# Patient Record
Sex: MALE | Race: WHITE | Employment: OTHER | URBAN - METROPOLITAN AREA
[De-identification: names, ages, dates, MRNs, and addresses within clinical notes are randomized per-mention and may not be internally consistent; named-entity substitution may affect disease eponyms.]

---

## 2017-06-05 ENCOUNTER — HOSPITAL ENCOUNTER (OUTPATIENT)
Dept: CT IMAGING | Age: 62
Discharge: HOME | End: 2017-06-05
Attending: NURSE PRACTITIONER | Admitting: NURSE PRACTITIONER

## 2017-06-05 DIAGNOSIS — J43.0 UNILATERAL EMPHYSEMA (HCC): ICD-10-CM

## 2017-06-05 DIAGNOSIS — J43.0 UNILATERAL PULMONARY EMPHYSEMA (MACLEOD'S SYNDROME) (HCC): ICD-10-CM

## 2017-06-05 LAB
CREAT SERPL-MCNC: 0.9 MG/DL (ref 0.8–1.3)
GFR AFRICAN AMERICAN: >60
GFR NON-AFRICAN AMERICAN: >60

## 2020-02-06 ENCOUNTER — HOSPITAL ENCOUNTER (OUTPATIENT)
Dept: NON INVASIVE DIAGNOSTICS | Age: 65
Discharge: HOME | End: 2020-02-06
Payer: COMMERCIAL

## 2020-02-06 ENCOUNTER — HOSPITAL ENCOUNTER (OUTPATIENT)
Dept: CT IMAGING | Age: 65
Discharge: HOME | End: 2020-02-06
Payer: COMMERCIAL

## 2020-02-06 LAB
LV EF: 47 %
LVEF MODALITY: NORMAL

## 2020-02-06 PROCEDURE — 71260 CT THORAX DX C+: CPT

## 2020-02-06 PROCEDURE — 6360000002 HC RX W HCPCS: Performed by: INTERNAL MEDICINE

## 2020-02-06 PROCEDURE — A9502 TC99M TETROFOSMIN: HCPCS | Performed by: INTERNAL MEDICINE

## 2020-02-06 PROCEDURE — 93017 CV STRESS TEST TRACING ONLY: CPT

## 2020-02-06 PROCEDURE — 3430000000 HC RX DIAGNOSTIC RADIOPHARMACEUTICAL: Performed by: INTERNAL MEDICINE

## 2020-02-06 PROCEDURE — 6360000004 HC RX CONTRAST MEDICATION: Performed by: INTERNAL MEDICINE

## 2020-02-06 PROCEDURE — 78452 HT MUSCLE IMAGE SPECT MULT: CPT

## 2020-02-06 RX ADMIN — REGADENOSON 0.4 MG: 0.08 INJECTION, SOLUTION INTRAVENOUS at 12:07

## 2020-02-06 RX ADMIN — IOPAMIDOL 75 ML: 755 INJECTION, SOLUTION INTRAVENOUS at 11:03

## 2020-02-06 RX ADMIN — TETROFOSMIN 10 MILLICURIE: 1.38 INJECTION, POWDER, LYOPHILIZED, FOR SOLUTION INTRAVENOUS at 10:57

## 2020-02-06 RX ADMIN — TETROFOSMIN 30 MILLICURIE: 1.38 INJECTION, POWDER, LYOPHILIZED, FOR SOLUTION INTRAVENOUS at 12:03

## 2020-02-20 ENCOUNTER — HOSPITAL ENCOUNTER (OUTPATIENT)
Dept: NON INVASIVE DIAGNOSTICS | Age: 65
Discharge: HOME | End: 2020-02-20
Payer: COMMERCIAL

## 2020-02-20 LAB
LV EF: 28 %
LVEF MODALITY: NORMAL

## 2020-02-20 PROCEDURE — 93306 TTE W/DOPPLER COMPLETE: CPT

## 2020-05-01 ENCOUNTER — HOSPITAL ENCOUNTER (EMERGENCY)
Age: 65
Discharge: HOME | End: 2020-05-01
Payer: COMMERCIAL

## 2020-05-01 VITALS
DIASTOLIC BLOOD PRESSURE: 84 MMHG | WEIGHT: 272.05 LBS | OXYGEN SATURATION: 96 % | SYSTOLIC BLOOD PRESSURE: 153 MMHG | HEART RATE: 103 BPM | TEMPERATURE: 97.9 F | HEIGHT: 68 IN | BODY MASS INDEX: 41.23 KG/M2 | RESPIRATION RATE: 22 BRPM

## 2020-05-01 LAB
A/G RATIO: 1.1 (ref 1.1–2.2)
ABO/RH: NORMAL
ALBUMIN SERPL-MCNC: 4.3 G/DL (ref 3.4–5)
ALP BLD-CCNC: 179 U/L (ref 40–129)
ALT SERPL-CCNC: 12 U/L (ref 10–40)
ANION GAP SERPL CALCULATED.3IONS-SCNC: 17 MMOL/L (ref 3–16)
ANTIBODY SCREEN: NORMAL
AST SERPL-CCNC: 17 U/L (ref 15–37)
BASOPHILS ABSOLUTE: 0.1 K/UL (ref 0–0.2)
BASOPHILS RELATIVE PERCENT: 1 %
BILIRUB SERPL-MCNC: 0.4 MG/DL (ref 0–1)
BUN BLDV-MCNC: 7 MG/DL (ref 7–20)
CALCIUM SERPL-MCNC: 9.2 MG/DL (ref 8.3–10.6)
CHLORIDE BLD-SCNC: 103 MMOL/L (ref 99–110)
CO2: 22 MMOL/L (ref 21–32)
CREAT SERPL-MCNC: 0.7 MG/DL (ref 0.8–1.3)
EOSINOPHILS ABSOLUTE: 0.1 K/UL (ref 0–0.6)
EOSINOPHILS RELATIVE PERCENT: 1.1 %
GFR AFRICAN AMERICAN: >60
GFR NON-AFRICAN AMERICAN: >60
GLOBULIN: 3.8 G/DL
GLUCOSE BLD-MCNC: 110 MG/DL (ref 70–99)
HCT VFR BLD CALC: 47.8 % (ref 40.5–52.5)
HEMOGLOBIN: 16.1 G/DL (ref 13.5–17.5)
LACTIC ACID: 3 MMOL/L (ref 0.4–2)
LYMPHOCYTES ABSOLUTE: 1.6 K/UL (ref 1–5.1)
LYMPHOCYTES RELATIVE PERCENT: 20.6 %
MCH RBC QN AUTO: 29.6 PG (ref 26–34)
MCHC RBC AUTO-ENTMCNC: 33.8 G/DL (ref 31–36)
MCV RBC AUTO: 87.6 FL (ref 80–100)
MONOCYTES ABSOLUTE: 0.8 K/UL (ref 0–1.3)
MONOCYTES RELATIVE PERCENT: 11 %
NEUTROPHILS ABSOLUTE: 5 K/UL (ref 1.7–7.7)
NEUTROPHILS RELATIVE PERCENT: 66.3 %
OCCULT BLOOD DIAGNOSTIC: NORMAL
PDW BLD-RTO: 15.7 % (ref 12.4–15.4)
PLATELET # BLD: 193 K/UL (ref 135–450)
PLATELET SLIDE REVIEW: ADEQUATE
PMV BLD AUTO: 11.1 FL (ref 5–10.5)
POTASSIUM REFLEX MAGNESIUM: 3.6 MMOL/L (ref 3.5–5.1)
RBC # BLD: 5.46 M/UL (ref 4.2–5.9)
SODIUM BLD-SCNC: 142 MMOL/L (ref 136–145)
TOTAL PROTEIN: 8.1 G/DL (ref 6.4–8.2)
WBC # BLD: 7.6 K/UL (ref 4–11)

## 2020-05-01 PROCEDURE — 99283 EMERGENCY DEPT VISIT LOW MDM: CPT

## 2020-05-01 PROCEDURE — 86900 BLOOD TYPING SEROLOGIC ABO: CPT

## 2020-05-01 PROCEDURE — 85025 COMPLETE CBC W/AUTO DIFF WBC: CPT

## 2020-05-01 PROCEDURE — 86901 BLOOD TYPING SEROLOGIC RH(D): CPT

## 2020-05-01 PROCEDURE — 83605 ASSAY OF LACTIC ACID: CPT

## 2020-05-01 PROCEDURE — 96360 HYDRATION IV INFUSION INIT: CPT

## 2020-05-01 PROCEDURE — G0328 FECAL BLOOD SCRN IMMUNOASSAY: HCPCS

## 2020-05-01 PROCEDURE — 86850 RBC ANTIBODY SCREEN: CPT

## 2020-05-01 PROCEDURE — 2580000003 HC RX 258: Performed by: NURSE PRACTITIONER

## 2020-05-01 PROCEDURE — 80053 COMPREHEN METABOLIC PANEL: CPT

## 2020-05-01 RX ORDER — 0.9 % SODIUM CHLORIDE 0.9 %
1000 INTRAVENOUS SOLUTION INTRAVENOUS ONCE
Status: COMPLETED | OUTPATIENT
Start: 2020-05-01 | End: 2020-05-01

## 2020-05-01 RX ADMIN — SODIUM CHLORIDE 1000 ML: 9 INJECTION, SOLUTION INTRAVENOUS at 20:09

## 2020-05-01 ASSESSMENT — ENCOUNTER SYMPTOMS
DIARRHEA: 0
BLOOD IN STOOL: 1
NAUSEA: 0
BACK PAIN: 0
ABDOMINAL PAIN: 0
SHORTNESS OF BREATH: 0
COUGH: 0
ABDOMINAL DISTENTION: 0
CONSTIPATION: 0
VOMITING: 0

## 2020-05-01 NOTE — ED PROVIDER NOTES
**EVALUATED BY ADVANCED PRACTICE PROVIDER**        [address] [facility] ENCOUNTER      Pt Name: Greg De Leon  WK  [place] 1955  Date of evaluation: 2020  Provider: THANIA Choi CNP      Chief Complaint:    Chief Complaint   Patient presents with    Rectal Bleeding    Diarrhea       Nursing Notes, Past Medical Hx, Past Surgical Hx, Social Hx, Allergies, and Family Hx were all reviewed and agreed with or any disagreements were addressed in the HPI.    HPI:  (Location, Duration, Timing, Severity, Quality, Assoc Sx, Context, Modifying factors)  This is a  75 y.o. male with PMH significant for A. fib anticoagulated on Eliquis who presents to the emergency department today complaining of bright red blood per rectum. He advised he has had one episode daily for the last 2 weeks. He denies abdominal pain. No fevers. No rectal pain. Patient does report that he was recently on a 3-day alcohol binger but is sober now. PastMedical/Surgical History:      Diagnosis Date    A-fib ([facility] [address]     irreg heartbeat    Arthritis     Bone spur     l ankle    DDD (degenerative disc disease)     DM (diabetes mellitus) ([address]     ETOH abuse     recovering    Hepatitis C     HTN (hypertension)     Internal hemorrhoids     OA (osteoarthritis)     Sleep apnea     Smoker     ippd    Spinal stenosis      History reviewed. No pertinent surgical history. Medications:  Previous Medications    ALBUTEROL (PROVENTIL HFA) 108 (90 BASE) MCG/ACT INHALER    Inhale 2 puffs into the lungs every 6 hours as needed for Wheezing. AMLODIPINE (NORVASC) 10 MG TABLET    TAKE ONE TABLET BY MOUTH EVERY DAY    BUDESONIDE-FORMOTEROL (SYMBICORT) 160-4.5 MCG/ACT AERO    Inhale 2 puffs into the lungs 2 times daily.     BUSPIRONE (BUSPAR) 10 MG TABLET    TAKE ONE TABLET BY MOUTH TWICE A DAY    CARVEDILOL (COREG) 25 MG TABLET    TAKE ONE TABLET BY MOUTH TWICE A DAY range of motion. Vascular: No JVD. Cardiovascular:      Rate and Rhythm: Tachycardia present. Rhythm irregularly irregular. Heart sounds: No murmur. No friction rub. No gallop. Pulmonary:      Effort: Pulmonary effort is normal. No respiratory distress. Breath sounds: Normal breath sounds. Abdominal:      General: There is no distension. Palpations: Abdomen is soft. Abdomen is not rigid. Tenderness: There is no abdominal tenderness. There is no guarding or rebound. Genitourinary:     Comments: No anal fissures or external hemorrhoid visualized. Digital rectal exam performed and tolerated well with Ignacia Ortega RN present. No internal hemorrhoids or other masses palpated. No exquisite prostate tenderness. No bright red blood return. Soft, light brown stool returned which tested Hemoccult negative. Musculoskeletal: Normal range of motion. Skin:     General: Skin is warm and dry. Capillary Refill: Capillary refill takes less than 2 seconds. Findings: No rash. Neurological:      General: No focal deficit present. Mental Status: He is alert and oriented to person, place, and time. Cranial Nerves: Cranial nerves are intact.    Psychiatric:         Mood and Affect: Mood normal.         MEDICAL DECISION MAKING    Vitals:    Vitals:    05/01/20 1902 05/01/20 1916 05/01/20 1930 05/01/20 2000   BP: (!) 151/77  (!) 142/60 138/64   Pulse: 100  108 118   Resp: 18  22 27   Temp: 97.9 °F (36.6 °C)      TempSrc: Oral      SpO2: 96%  95% 95%   Weight:  272 lb 0.8 oz (123.4 kg)     Height: 5' 8\" (1.727 m)          LABS:  Labs Reviewed   CBC WITH AUTO DIFFERENTIAL - Abnormal; Notable for the following components:       Result Value    RDW 15.7 (*)     MPV 11.1 (*)     All other components within normal limits    Narrative:     Performed at:  [address] [address]   Phone [phone]   COMPREHENSIVE METABOLIC PANEL W/ REFLEX TO MG FOR LOW K - Abnormal; Notable for the following components:    Anion Gap 17 (*)     Glucose 110 (*)     CREATININE 0.7 (*)     Alkaline Phosphatase 179 (*)     All other components within normal limits    Narrative:     Performed at:  [address] [address]   Phone [phone]   LACTIC ACID, PLASMA - Abnormal; Notable for the following components:    Lactic Acid 3.0 (*)     All other components within normal limits    Narrative:     Performed at:  [address] [address]   Phone [phone]   BLOOD OCCULT STOOL DIAGNOSTIC    Narrative:     ORDER#: [id number]                          ORDERED BY: Lexi Valencia  SOURCE: Stool                              COLLECTED:  05/01/20 20:10  ANTIBIOTICS AT BECKIE.:                      RECEIVED :  05/01/20 20:23  Performed at:  [address] [address]   Phone [phone]   TYPE AND SCREEN    Narrative:     Performed at:  [facility]  [address] [place] [address]   Phone [phone] of labs reviewed and werenegative at this time or not returned at the time of this note. RADIOLOGY:   Non-plain film images such as CT, Ultrasound and MRI are read by the radiologist. THANIA Hernández CNP have directly visualized the radiologic plain film image(s) with the below findings:        Interpretation per the Radiologist below, if available at the time of this note:    No orders to display        No results found.        MEDICAL DECISION MAKING / ED COURSE:      PROCEDURES:   Procedures    None    Patient was given:  Medications   0.9 % sodium chloride bolus (1,000 mLs Intravenous New Bag 5/1/20 2009)       Differential diagnosis: Hemorrhagic Shock, Coagulopathy, Platelet Dysfunction or

## 2020-11-03 PROBLEM — M19.90 OA (OSTEOARTHRITIS): Status: RESOLVED | Noted: 2020-11-03 | Resolved: 2020-11-03

## 2024-02-03 ENCOUNTER — HOSPITAL ENCOUNTER (INPATIENT)
Age: 69
LOS: 7 days | Discharge: HOME HEALTH | End: 2024-02-10
Attending: EMERGENCY MEDICINE | Admitting: INTERNAL MEDICINE
Payer: MEDICARE

## 2024-02-03 ENCOUNTER — APPOINTMENT (OUTPATIENT)
Dept: GENERAL RADIOLOGY | Age: 69
End: 2024-02-03
Payer: MEDICARE

## 2024-02-03 DIAGNOSIS — I50.9 ACUTE ON CHRONIC CONGESTIVE HEART FAILURE, UNSPECIFIED HEART FAILURE TYPE (HCC): Primary | ICD-10-CM

## 2024-02-03 DIAGNOSIS — I48.91 ATRIAL FIBRILLATION WITH RVR (HCC): ICD-10-CM

## 2024-02-03 DIAGNOSIS — R06.00 DYSPNEA, UNSPECIFIED TYPE: ICD-10-CM

## 2024-02-03 PROBLEM — I50.23 ACUTE ON CHRONIC SYSTOLIC CHF (CONGESTIVE HEART FAILURE) (HCC): Status: ACTIVE | Noted: 2024-02-03

## 2024-02-03 LAB
ALBUMIN SERPL-MCNC: 3.9 G/DL (ref 3.4–5)
ALBUMIN/GLOB SERPL: 1.1 {RATIO} (ref 1.1–2.2)
ALP SERPL-CCNC: 134 U/L (ref 40–129)
ALT SERPL-CCNC: 12 U/L (ref 10–40)
ANION GAP SERPL CALCULATED.3IONS-SCNC: 13 MMOL/L (ref 3–16)
AST SERPL-CCNC: 16 U/L (ref 15–37)
BACTERIA URNS QL MICRO: ABNORMAL /HPF
BASOPHILS # BLD: 0.1 K/UL (ref 0–0.2)
BASOPHILS NFR BLD: 1 %
BILIRUB SERPL-MCNC: 2.1 MG/DL (ref 0–1)
BILIRUB UR QL STRIP.AUTO: NEGATIVE
BUN SERPL-MCNC: 15 MG/DL (ref 7–20)
CALCIUM SERPL-MCNC: 10.1 MG/DL (ref 8.3–10.6)
CHLORIDE SERPL-SCNC: 102 MMOL/L (ref 99–110)
CLARITY UR: CLEAR
CO2 SERPL-SCNC: 26 MMOL/L (ref 21–32)
COLOR UR: YELLOW
CREAT SERPL-MCNC: 1.2 MG/DL (ref 0.8–1.3)
DEPRECATED RDW RBC AUTO: 15.7 % (ref 12.4–15.4)
EOSINOPHIL # BLD: 0 K/UL (ref 0–0.6)
EOSINOPHIL NFR BLD: 0.4 %
EPI CELLS #/AREA URNS HPF: ABNORMAL /HPF (ref 0–5)
ETHANOLAMINE SERPL-MCNC: NORMAL MG/DL (ref 0–0.08)
FLUAV RNA UPPER RESP QL NAA+PROBE: NEGATIVE
FLUBV AG NPH QL: NEGATIVE
GFR SERPLBLD CREATININE-BSD FMLA CKD-EPI: >60 ML/MIN/{1.73_M2}
GLUCOSE SERPL-MCNC: 124 MG/DL (ref 70–99)
GLUCOSE UR STRIP.AUTO-MCNC: NEGATIVE MG/DL
HCT VFR BLD AUTO: 49.4 % (ref 40.5–52.5)
HGB BLD-MCNC: 16.4 G/DL (ref 13.5–17.5)
HGB UR QL STRIP.AUTO: ABNORMAL
HYALINE CASTS #/AREA URNS LPF: ABNORMAL /LPF (ref 0–2)
INR PPP: 1.1 (ref 0.84–1.16)
KETONES UR STRIP.AUTO-MCNC: NEGATIVE MG/DL
LACTATE BLDV-SCNC: 1.8 MMOL/L (ref 0.4–2)
LEUKOCYTE ESTERASE UR QL STRIP.AUTO: NEGATIVE
LYMPHOCYTES # BLD: 0.9 K/UL (ref 1–5.1)
LYMPHOCYTES NFR BLD: 11.1 %
MCH RBC QN AUTO: 28.1 PG (ref 26–34)
MCHC RBC AUTO-ENTMCNC: 33.1 G/DL (ref 31–36)
MCV RBC AUTO: 84.7 FL (ref 80–100)
MONOCYTES # BLD: 0.8 K/UL (ref 0–1.3)
MONOCYTES NFR BLD: 9.2 %
MUCOUS THREADS #/AREA URNS LPF: ABNORMAL /LPF
NEUTROPHILS # BLD: 6.4 K/UL (ref 1.7–7.7)
NEUTROPHILS NFR BLD: 78.3 %
NITRITE UR QL STRIP.AUTO: NEGATIVE
NT-PROBNP SERPL-MCNC: 5655 PG/ML (ref 0–124)
PH UR STRIP.AUTO: 6 [PH] (ref 5–8)
PLATELET # BLD AUTO: 153 K/UL (ref 135–450)
PLATELET BLD QL SMEAR: ADEQUATE
PMV BLD AUTO: 12.8 FL (ref 5–10.5)
POTASSIUM SERPL-SCNC: 3.5 MMOL/L (ref 3.5–5.1)
PROCALCITONIN SERPL IA-MCNC: 0.07 NG/ML (ref 0–0.15)
PROT SERPL-MCNC: 7.5 G/DL (ref 6.4–8.2)
PROT UR STRIP.AUTO-MCNC: 30 MG/DL
PROTHROMBIN TIME: 14.2 SEC (ref 11.5–14.8)
RBC # BLD AUTO: 5.83 M/UL (ref 4.2–5.9)
RBC #/AREA URNS HPF: ABNORMAL /HPF (ref 0–4)
SARS-COV-2 RDRP RESP QL NAA+PROBE: NOT DETECTED
SLIDE REVIEW: ABNORMAL
SODIUM SERPL-SCNC: 141 MMOL/L (ref 136–145)
SP GR UR STRIP.AUTO: 1.02 (ref 1–1.03)
TROPONIN, HIGH SENSITIVITY: 34 NG/L (ref 0–22)
TROPONIN, HIGH SENSITIVITY: 34 NG/L (ref 0–22)
UA COMPLETE W REFLEX CULTURE PNL UR: ABNORMAL
UA DIPSTICK W REFLEX MICRO PNL UR: YES
URN SPEC COLLECT METH UR: ABNORMAL
UROBILINOGEN UR STRIP-ACNC: 1 E.U./DL
WBC # BLD AUTO: 8.1 K/UL (ref 4–11)
WBC #/AREA URNS HPF: ABNORMAL /HPF (ref 0–5)

## 2024-02-03 PROCEDURE — 6370000000 HC RX 637 (ALT 250 FOR IP): Performed by: INTERNAL MEDICINE

## 2024-02-03 PROCEDURE — 81001 URINALYSIS AUTO W/SCOPE: CPT

## 2024-02-03 PROCEDURE — 2580000003 HC RX 258: Performed by: EMERGENCY MEDICINE

## 2024-02-03 PROCEDURE — 2580000003 HC RX 258: Performed by: INTERNAL MEDICINE

## 2024-02-03 PROCEDURE — 84484 ASSAY OF TROPONIN QUANT: CPT

## 2024-02-03 PROCEDURE — 82077 ASSAY SPEC XCP UR&BREATH IA: CPT

## 2024-02-03 PROCEDURE — 80053 COMPREHEN METABOLIC PANEL: CPT

## 2024-02-03 PROCEDURE — 6370000000 HC RX 637 (ALT 250 FOR IP): Performed by: EMERGENCY MEDICINE

## 2024-02-03 PROCEDURE — 83605 ASSAY OF LACTIC ACID: CPT

## 2024-02-03 PROCEDURE — 2060000000 HC ICU INTERMEDIATE R&B

## 2024-02-03 PROCEDURE — 71045 X-RAY EXAM CHEST 1 VIEW: CPT

## 2024-02-03 PROCEDURE — 87804 INFLUENZA ASSAY W/OPTIC: CPT

## 2024-02-03 PROCEDURE — 87635 SARS-COV-2 COVID-19 AMP PRB: CPT

## 2024-02-03 PROCEDURE — 84145 PROCALCITONIN (PCT): CPT

## 2024-02-03 PROCEDURE — 6360000002 HC RX W HCPCS: Performed by: EMERGENCY MEDICINE

## 2024-02-03 PROCEDURE — 83880 ASSAY OF NATRIURETIC PEPTIDE: CPT

## 2024-02-03 PROCEDURE — 36415 COLL VENOUS BLD VENIPUNCTURE: CPT

## 2024-02-03 PROCEDURE — 99285 EMERGENCY DEPT VISIT HI MDM: CPT

## 2024-02-03 PROCEDURE — 96374 THER/PROPH/DIAG INJ IV PUSH: CPT

## 2024-02-03 PROCEDURE — 87040 BLOOD CULTURE FOR BACTERIA: CPT

## 2024-02-03 PROCEDURE — 85025 COMPLETE CBC W/AUTO DIFF WBC: CPT

## 2024-02-03 PROCEDURE — 85610 PROTHROMBIN TIME: CPT

## 2024-02-03 PROCEDURE — 96365 THER/PROPH/DIAG IV INF INIT: CPT

## 2024-02-03 PROCEDURE — 93005 ELECTROCARDIOGRAM TRACING: CPT | Performed by: EMERGENCY MEDICINE

## 2024-02-03 RX ORDER — SODIUM CHLORIDE 0.9 % (FLUSH) 0.9 %
5-40 SYRINGE (ML) INJECTION PRN
Status: DISCONTINUED | OUTPATIENT
Start: 2024-02-03 | End: 2024-02-10 | Stop reason: HOSPADM

## 2024-02-03 RX ORDER — POLYETHYLENE GLYCOL 3350 17 G/17G
17 POWDER, FOR SOLUTION ORAL DAILY PRN
Status: DISCONTINUED | OUTPATIENT
Start: 2024-02-03 | End: 2024-02-10 | Stop reason: HOSPADM

## 2024-02-03 RX ORDER — POTASSIUM CHLORIDE 20 MEQ/1
40 TABLET, EXTENDED RELEASE ORAL PRN
Status: DISCONTINUED | OUTPATIENT
Start: 2024-02-03 | End: 2024-02-10 | Stop reason: HOSPADM

## 2024-02-03 RX ORDER — CITALOPRAM 20 MG/1
20 TABLET ORAL DAILY
Status: DISCONTINUED | OUTPATIENT
Start: 2024-02-04 | End: 2024-02-10 | Stop reason: HOSPADM

## 2024-02-03 RX ORDER — ACETAMINOPHEN 650 MG/1
650 SUPPOSITORY RECTAL EVERY 6 HOURS PRN
Status: DISCONTINUED | OUTPATIENT
Start: 2024-02-03 | End: 2024-02-10 | Stop reason: HOSPADM

## 2024-02-03 RX ORDER — GABAPENTIN 300 MG/1
600 CAPSULE ORAL 3 TIMES DAILY
Status: DISCONTINUED | OUTPATIENT
Start: 2024-02-03 | End: 2024-02-05

## 2024-02-03 RX ORDER — ACETAMINOPHEN 325 MG/1
650 TABLET ORAL EVERY 6 HOURS PRN
Status: DISCONTINUED | OUTPATIENT
Start: 2024-02-03 | End: 2024-02-10 | Stop reason: HOSPADM

## 2024-02-03 RX ORDER — SODIUM CHLORIDE 0.9 % (FLUSH) 0.9 %
5-40 SYRINGE (ML) INJECTION EVERY 12 HOURS SCHEDULED
Status: DISCONTINUED | OUTPATIENT
Start: 2024-02-03 | End: 2024-02-10 | Stop reason: HOSPADM

## 2024-02-03 RX ORDER — MAGNESIUM SULFATE IN WATER 40 MG/ML
2000 INJECTION, SOLUTION INTRAVENOUS PRN
Status: DISCONTINUED | OUTPATIENT
Start: 2024-02-03 | End: 2024-02-10 | Stop reason: HOSPADM

## 2024-02-03 RX ORDER — GABAPENTIN 600 MG/1
600 TABLET ORAL 3 TIMES DAILY
Status: ON HOLD | COMMUNITY
End: 2024-02-10 | Stop reason: HOSPADM

## 2024-02-03 RX ORDER — NICOTINE 21 MG/24HR
1 PATCH, TRANSDERMAL 24 HOURS TRANSDERMAL DAILY
Status: DISCONTINUED | OUTPATIENT
Start: 2024-02-04 | End: 2024-02-10 | Stop reason: HOSPADM

## 2024-02-03 RX ORDER — ONDANSETRON 2 MG/ML
4 INJECTION INTRAMUSCULAR; INTRAVENOUS EVERY 6 HOURS PRN
Status: DISCONTINUED | OUTPATIENT
Start: 2024-02-03 | End: 2024-02-10 | Stop reason: HOSPADM

## 2024-02-03 RX ORDER — ALBUTEROL SULFATE 90 UG/1
2 AEROSOL, METERED RESPIRATORY (INHALATION) EVERY 6 HOURS PRN
Status: DISCONTINUED | OUTPATIENT
Start: 2024-02-03 | End: 2024-02-10 | Stop reason: HOSPADM

## 2024-02-03 RX ORDER — ONDANSETRON 4 MG/1
4 TABLET, ORALLY DISINTEGRATING ORAL EVERY 8 HOURS PRN
Status: DISCONTINUED | OUTPATIENT
Start: 2024-02-03 | End: 2024-02-10 | Stop reason: HOSPADM

## 2024-02-03 RX ORDER — 0.9 % SODIUM CHLORIDE 0.9 %
30 INTRAVENOUS SOLUTION INTRAVENOUS ONCE
Status: DISCONTINUED | OUTPATIENT
Start: 2024-02-03 | End: 2024-02-03

## 2024-02-03 RX ORDER — MAGNESIUM SULFATE IN WATER 40 MG/ML
2000 INJECTION, SOLUTION INTRAVENOUS ONCE
Status: COMPLETED | OUTPATIENT
Start: 2024-02-03 | End: 2024-02-03

## 2024-02-03 RX ORDER — FERROUS SULFATE 325(65) MG
325 TABLET ORAL 2 TIMES DAILY WITH MEALS
Status: DISCONTINUED | OUTPATIENT
Start: 2024-02-04 | End: 2024-02-10 | Stop reason: HOSPADM

## 2024-02-03 RX ORDER — 0.9 % SODIUM CHLORIDE 0.9 %
500 INTRAVENOUS SOLUTION INTRAVENOUS ONCE
Status: COMPLETED | OUTPATIENT
Start: 2024-02-03 | End: 2024-02-03

## 2024-02-03 RX ORDER — FLUTICASONE FUROATE, UMECLIDINIUM BROMIDE AND VILANTEROL TRIFENATATE 100; 62.5; 25 UG/1; UG/1; UG/1
1 POWDER RESPIRATORY (INHALATION) DAILY
COMMUNITY

## 2024-02-03 RX ORDER — AMLODIPINE BESYLATE 10 MG/1
10 TABLET ORAL DAILY
Status: DISCONTINUED | OUTPATIENT
Start: 2024-02-03 | End: 2024-02-03

## 2024-02-03 RX ORDER — LISINOPRIL 40 MG/1
40 TABLET ORAL DAILY
Status: DISCONTINUED | OUTPATIENT
Start: 2024-02-04 | End: 2024-02-04

## 2024-02-03 RX ORDER — FUROSEMIDE 10 MG/ML
40 INJECTION INTRAMUSCULAR; INTRAVENOUS 2 TIMES DAILY
Status: DISCONTINUED | OUTPATIENT
Start: 2024-02-04 | End: 2024-02-04

## 2024-02-03 RX ORDER — AMLODIPINE BESYLATE 10 MG/1
10 TABLET ORAL DAILY
Status: DISCONTINUED | OUTPATIENT
Start: 2024-02-04 | End: 2024-02-04

## 2024-02-03 RX ORDER — CARVEDILOL 25 MG/1
25 TABLET ORAL 2 TIMES DAILY WITH MEALS
Status: DISCONTINUED | OUTPATIENT
Start: 2024-02-04 | End: 2024-02-05

## 2024-02-03 RX ORDER — FUROSEMIDE 10 MG/ML
40 INJECTION INTRAMUSCULAR; INTRAVENOUS ONCE
Status: COMPLETED | OUTPATIENT
Start: 2024-02-03 | End: 2024-02-03

## 2024-02-03 RX ORDER — POTASSIUM CHLORIDE 7.45 MG/ML
10 INJECTION INTRAVENOUS PRN
Status: DISCONTINUED | OUTPATIENT
Start: 2024-02-03 | End: 2024-02-10 | Stop reason: HOSPADM

## 2024-02-03 RX ORDER — ENOXAPARIN SODIUM 100 MG/ML
30 INJECTION SUBCUTANEOUS 2 TIMES DAILY
Status: DISCONTINUED | OUTPATIENT
Start: 2024-02-04 | End: 2024-02-03

## 2024-02-03 RX ORDER — SODIUM CHLORIDE 9 MG/ML
INJECTION, SOLUTION INTRAVENOUS PRN
Status: DISCONTINUED | OUTPATIENT
Start: 2024-02-03 | End: 2024-02-10 | Stop reason: HOSPADM

## 2024-02-03 RX ORDER — CARVEDILOL 6.25 MG/1
12.5 TABLET ORAL ONCE
Status: COMPLETED | OUTPATIENT
Start: 2024-02-03 | End: 2024-02-03

## 2024-02-03 RX ORDER — BUSPIRONE HYDROCHLORIDE 10 MG/1
10 TABLET ORAL 2 TIMES DAILY
Status: DISCONTINUED | OUTPATIENT
Start: 2024-02-04 | End: 2024-02-03

## 2024-02-03 RX ORDER — 0.9 % SODIUM CHLORIDE 0.9 %
500 INTRAVENOUS SOLUTION INTRAVENOUS ONCE
Status: DISCONTINUED | OUTPATIENT
Start: 2024-02-03 | End: 2024-02-03

## 2024-02-03 RX ADMIN — APIXABAN 2.5 MG: 2.5 TABLET, FILM COATED ORAL at 23:21

## 2024-02-03 RX ADMIN — Medication 10 ML: at 23:21

## 2024-02-03 RX ADMIN — SODIUM CHLORIDE 500 ML: 9 INJECTION, SOLUTION INTRAVENOUS at 19:21

## 2024-02-03 RX ADMIN — NITROGLYCERIN 1 INCH: 20 OINTMENT TOPICAL at 19:46

## 2024-02-03 RX ADMIN — MAGNESIUM SULFATE HEPTAHYDRATE 2000 MG: 40 INJECTION, SOLUTION INTRAVENOUS at 19:27

## 2024-02-03 RX ADMIN — SODIUM CHLORIDE 2121 ML: 9 INJECTION, SOLUTION INTRAVENOUS at 19:16

## 2024-02-03 RX ADMIN — FUROSEMIDE 40 MG: 10 INJECTION, SOLUTION INTRAMUSCULAR; INTRAVENOUS at 19:28

## 2024-02-03 RX ADMIN — CARVEDILOL 12.5 MG: 6.25 TABLET, FILM COATED ORAL at 21:48

## 2024-02-03 RX ADMIN — AMLODIPINE BESYLATE 10 MG: 5 TABLET ORAL at 21:23

## 2024-02-03 RX ADMIN — GABAPENTIN 600 MG: 300 CAPSULE ORAL at 23:21

## 2024-02-03 ASSESSMENT — PAIN - FUNCTIONAL ASSESSMENT: PAIN_FUNCTIONAL_ASSESSMENT: NONE - DENIES PAIN

## 2024-02-04 PROBLEM — I50.9 ACUTE ON CHRONIC CONGESTIVE HEART FAILURE (HCC): Status: ACTIVE | Noted: 2024-02-04

## 2024-02-04 PROBLEM — E87.6 HYPOKALEMIA: Status: ACTIVE | Noted: 2024-02-04

## 2024-02-04 LAB
ANION GAP SERPL CALCULATED.3IONS-SCNC: 9 MMOL/L (ref 3–16)
BASOPHILS # BLD: 0.2 K/UL (ref 0–0.2)
BASOPHILS NFR BLD: 2.1 %
BUN SERPL-MCNC: 13 MG/DL (ref 7–20)
CALCIUM SERPL-MCNC: 8.6 MG/DL (ref 8.3–10.6)
CHLORIDE SERPL-SCNC: 100 MMOL/L (ref 99–110)
CO2 SERPL-SCNC: 30 MMOL/L (ref 21–32)
CREAT SERPL-MCNC: 1.1 MG/DL (ref 0.8–1.3)
DEPRECATED RDW RBC AUTO: 15.7 % (ref 12.4–15.4)
EKG ATRIAL RATE: 100 BPM
EKG DIAGNOSIS: NORMAL
EKG Q-T INTERVAL: 360 MS
EKG QRS DURATION: 84 MS
EKG QTC CALCULATION (BAZETT): 482 MS
EKG R AXIS: 113 DEGREES
EKG T AXIS: 147 DEGREES
EKG VENTRICULAR RATE: 108 BPM
EOSINOPHIL # BLD: 0.1 K/UL (ref 0–0.6)
EOSINOPHIL NFR BLD: 1 %
GFR SERPLBLD CREATININE-BSD FMLA CKD-EPI: >60 ML/MIN/{1.73_M2}
GLUCOSE SERPL-MCNC: 109 MG/DL (ref 70–99)
HCT VFR BLD AUTO: 44.6 % (ref 40.5–52.5)
HGB BLD-MCNC: 14.9 G/DL (ref 13.5–17.5)
LYMPHOCYTES # BLD: 0.9 K/UL (ref 1–5.1)
LYMPHOCYTES NFR BLD: 12.1 %
MAGNESIUM SERPL-MCNC: 1.9 MG/DL (ref 1.8–2.4)
MCH RBC QN AUTO: 28.4 PG (ref 26–34)
MCHC RBC AUTO-ENTMCNC: 33.3 G/DL (ref 31–36)
MCV RBC AUTO: 85.1 FL (ref 80–100)
MONOCYTES # BLD: 0.7 K/UL (ref 0–1.3)
MONOCYTES NFR BLD: 10.1 %
NEUTROPHILS # BLD: 5.5 K/UL (ref 1.7–7.7)
NEUTROPHILS NFR BLD: 74.7 %
PLATELET # BLD AUTO: 124 K/UL (ref 135–450)
PLATELET BLD QL SMEAR: ABNORMAL
PMV BLD AUTO: 12.6 FL (ref 5–10.5)
POTASSIUM SERPL-SCNC: 3.2 MMOL/L (ref 3.5–5.1)
RBC # BLD AUTO: 5.24 M/UL (ref 4.2–5.9)
SLIDE REVIEW: ABNORMAL
SODIUM SERPL-SCNC: 139 MMOL/L (ref 136–145)
WBC # BLD AUTO: 7.3 K/UL (ref 4–11)

## 2024-02-04 PROCEDURE — 6370000000 HC RX 637 (ALT 250 FOR IP): Performed by: INTERNAL MEDICINE

## 2024-02-04 PROCEDURE — 2580000003 HC RX 258: Performed by: INTERNAL MEDICINE

## 2024-02-04 PROCEDURE — 83735 ASSAY OF MAGNESIUM: CPT

## 2024-02-04 PROCEDURE — 97161 PT EVAL LOW COMPLEX 20 MIN: CPT

## 2024-02-04 PROCEDURE — 6370000000 HC RX 637 (ALT 250 FOR IP): Performed by: STUDENT IN AN ORGANIZED HEALTH CARE EDUCATION/TRAINING PROGRAM

## 2024-02-04 PROCEDURE — 97535 SELF CARE MNGMENT TRAINING: CPT

## 2024-02-04 PROCEDURE — 80048 BASIC METABOLIC PNL TOTAL CA: CPT

## 2024-02-04 PROCEDURE — 99223 1ST HOSP IP/OBS HIGH 75: CPT | Performed by: INTERNAL MEDICINE

## 2024-02-04 PROCEDURE — 97165 OT EVAL LOW COMPLEX 30 MIN: CPT

## 2024-02-04 PROCEDURE — 97530 THERAPEUTIC ACTIVITIES: CPT

## 2024-02-04 PROCEDURE — 93010 ELECTROCARDIOGRAM REPORT: CPT | Performed by: INTERNAL MEDICINE

## 2024-02-04 PROCEDURE — 6360000002 HC RX W HCPCS: Performed by: INTERNAL MEDICINE

## 2024-02-04 PROCEDURE — 94760 N-INVAS EAR/PLS OXIMETRY 1: CPT

## 2024-02-04 PROCEDURE — 6370000000 HC RX 637 (ALT 250 FOR IP): Performed by: NURSE PRACTITIONER

## 2024-02-04 PROCEDURE — 36415 COLL VENOUS BLD VENIPUNCTURE: CPT

## 2024-02-04 PROCEDURE — 2700000000 HC OXYGEN THERAPY PER DAY

## 2024-02-04 PROCEDURE — 85025 COMPLETE CBC W/AUTO DIFF WBC: CPT

## 2024-02-04 PROCEDURE — 2060000000 HC ICU INTERMEDIATE R&B

## 2024-02-04 PROCEDURE — 94640 AIRWAY INHALATION TREATMENT: CPT

## 2024-02-04 RX ORDER — ALBUTEROL SULFATE 2.5 MG/3ML
2.5 SOLUTION RESPIRATORY (INHALATION) EVERY 4 HOURS PRN
Status: DISCONTINUED | OUTPATIENT
Start: 2024-02-04 | End: 2024-02-10 | Stop reason: HOSPADM

## 2024-02-04 RX ORDER — POTASSIUM CHLORIDE 20 MEQ/1
40 TABLET, EXTENDED RELEASE ORAL 2 TIMES DAILY
Status: DISCONTINUED | OUTPATIENT
Start: 2024-02-04 | End: 2024-02-04

## 2024-02-04 RX ORDER — ALPRAZOLAM 1 MG/1
1 TABLET ORAL 3 TIMES DAILY PRN
COMMUNITY

## 2024-02-04 RX ORDER — FUROSEMIDE 10 MG/ML
60 INJECTION INTRAMUSCULAR; INTRAVENOUS 2 TIMES DAILY
Status: DISCONTINUED | OUTPATIENT
Start: 2024-02-04 | End: 2024-02-06

## 2024-02-04 RX ORDER — ALPRAZOLAM 0.5 MG/1
1 TABLET ORAL 3 TIMES DAILY PRN
Status: DISCONTINUED | OUTPATIENT
Start: 2024-02-04 | End: 2024-02-10 | Stop reason: HOSPADM

## 2024-02-04 RX ORDER — LISINOPRIL 20 MG/1
20 TABLET ORAL DAILY
Status: DISCONTINUED | OUTPATIENT
Start: 2024-02-05 | End: 2024-02-10 | Stop reason: HOSPADM

## 2024-02-04 RX ADMIN — CITALOPRAM HYDROBROMIDE 20 MG: 20 TABLET ORAL at 09:07

## 2024-02-04 RX ADMIN — APIXABAN 2.5 MG: 2.5 TABLET, FILM COATED ORAL at 09:36

## 2024-02-04 RX ADMIN — APIXABAN 5 MG: 5 TABLET, FILM COATED ORAL at 20:21

## 2024-02-04 RX ADMIN — APIXABAN 2.5 MG: 5 TABLET, FILM COATED ORAL at 09:07

## 2024-02-04 RX ADMIN — FUROSEMIDE 40 MG: 10 INJECTION, SOLUTION INTRAMUSCULAR; INTRAVENOUS at 09:06

## 2024-02-04 RX ADMIN — FUROSEMIDE 60 MG: 10 INJECTION, SOLUTION INTRAMUSCULAR; INTRAVENOUS at 18:17

## 2024-02-04 RX ADMIN — Medication 10 ML: at 09:07

## 2024-02-04 RX ADMIN — FERROUS SULFATE TAB 325 MG (65 MG ELEMENTAL FE) 325 MG: 325 (65 FE) TAB at 09:07

## 2024-02-04 RX ADMIN — Medication 10 ML: at 09:14

## 2024-02-04 RX ADMIN — GABAPENTIN 600 MG: 300 CAPSULE ORAL at 15:33

## 2024-02-04 RX ADMIN — FERROUS SULFATE TAB 325 MG (65 MG ELEMENTAL FE) 325 MG: 325 (65 FE) TAB at 18:17

## 2024-02-04 RX ADMIN — Medication 10 ML: at 20:22

## 2024-02-04 RX ADMIN — MOMETASONE FUROATE AND FORMOTEROL FUMARATE DIHYDRATE 2 PUFF: 200; 5 AEROSOL RESPIRATORY (INHALATION) at 08:28

## 2024-02-04 RX ADMIN — MOMETASONE FUROATE AND FORMOTEROL FUMARATE DIHYDRATE 2 PUFF: 200; 5 AEROSOL RESPIRATORY (INHALATION) at 19:43

## 2024-02-04 RX ADMIN — GABAPENTIN 600 MG: 300 CAPSULE ORAL at 09:07

## 2024-02-04 RX ADMIN — POTASSIUM CHLORIDE 40 MEQ: 1500 TABLET, EXTENDED RELEASE ORAL at 09:13

## 2024-02-04 RX ADMIN — LISINOPRIL 40 MG: 40 TABLET ORAL at 09:14

## 2024-02-04 RX ADMIN — CARVEDILOL 25 MG: 25 TABLET, FILM COATED ORAL at 09:13

## 2024-02-04 RX ADMIN — ALPRAZOLAM 1 MG: 0.5 TABLET ORAL at 21:15

## 2024-02-04 RX ADMIN — GABAPENTIN 600 MG: 300 CAPSULE ORAL at 20:21

## 2024-02-04 ASSESSMENT — PAIN SCALES - GENERAL: PAINLEVEL_OUTOF10: 0

## 2024-02-04 NOTE — PROGRESS NOTES
Patient mentioned taking Xanax at home, but not listed on patient home medication list. Perfect serve message sent to Chiara Powell NP regarding this. Message received back of \"orders being placed per patient home regimen based on the LYNDON report\". New orders placed, see MAR.    Patient remains on 2L throughout shift. States he is \"feeling better than he was\". Patient also stated that he is a current smoker and has also been diagnosed with sleep apnea in the past. Patient stating he feels comfortable on the 2L, saturations remain 90 and above.

## 2024-02-04 NOTE — PROGRESS NOTES
Pharmacy Medication Reconciliation Note     List of medications patient is currently taking is complete.    Source of information:   1. Rx disp history  2. Patient interview    Notes regarding home medications:   1. Patient confirms has not taken meds for about 2 weeks      Denies taking any other OTC or herbal medications    Karri Fitzpatrick RPH, LORENZO 2/4/2024 11:35 AM

## 2024-02-04 NOTE — H&P
Hospital Medicine History & Physical      Date of Admission: 2/3/2024    Date of Service:  Pt seen/examined on 2/3/2024     [x]Admitted to Inpatient with expected LOS greater than two midnights due to medical therapy.  []Placed in Observation status.    Chief Admission Complaint:  Afib, Hypoxia    Presenting Admission History:      68 y.o. male who presented to [facility] as a transfer from [facility].  PMHx significant for A-fib, congestive heart failure with ejection fraction of 30%, diabetes mellitus type 2, previous alcohol use, hepatitis C, hypertension, tobacco use.  Patient presented to the emergency room as he has not been feeling well for the Last couple of weeks and stopped taking all his medication at home, he presented with fatigue weakness and some palpitation and chest discomfort with high dyspnea, was found to be hypoxic with pulse ox of 87% required placing him on 2 L nasal cannula, also blood pressure was elevated at 214/163 as patient has not been taking any of his medication for the last couple of weeks.  He was found to be in A-fib with RVR with heart rate up to 150 but then it will slow down and will come down to the 80s on his own.  Discussed with ED physician about giving him his dose of Coreg for today at least.  Patient also was supposed to be on Eliquis but has not taken the medication, patient is smoking a pack a day, he denied any nausea or vomiting, he just felt overwhelmed stopped taking his medications.  Denied any abdomen pain no urinary symptoms no constipation no diarrhea, noticed some swelling in his legs.  Labs showed potassium of 3.5, BNP was elevated at 5655 troponin stayed flat at 34, CBC was within normal limit, viral test for COVID and influenza were negative.  Urinalysis was negative for infection, chest x-ray showed bilateral basilar and perihilar predominant opacities possible edema with bilateral pleural effusion left greater than right..   TWICE A DAY 9/5/14   Mikhail Sandra MD   amLODIPine (NORVASC) 10 MG tablet TAKE ONE TABLET BY MOUTH EVERY DAY 9/5/14   Mikhail Sandra MD   warfarin (COUMADIN) 6 MG tablet Take 1 tablet by mouth daily. 9/5/14   Mikhail Sandra MD   hydrocortisone 1 % ointment Apply topically 2 times daily. 2/27/14   Candy Yun APRN - CNP   busPIRone (BUSPAR) 10 MG tablet TAKE ONE TABLET BY MOUTH TWICE A DAY 10/3/13   Candy Yun APRN - CNP   budesonide-formoterol (SYMBICORT) 160-4.5 MCG/ACT AERO Inhale 2 puffs into the lungs 2 times daily. 9/6/12   Candy Yun APRN - CNP   albuterol (PROVENTIL HFA) 108 (90 BASE) MCG/ACT inhaler Inhale 2 puffs into the lungs every 6 hours as needed for Wheezing. 9/6/12   Candy Yun APRN - CNP   meloxicam (MOBIC) 15 MG tablet Take 1 tablet by mouth daily. 6/14/12 6/14/13  Candy Yun APRN - CNP       Labs: Personally reviewed and interpreted for clinical significance.   Recent Labs     02/03/24 1859   WBC 8.1   HGB 16.4   HCT 49.4        Recent Labs     02/03/24  1859      K 3.5      CO2 26   BUN 15   CREATININE 1.2   CALCIUM 10.1     Recent Labs     02/03/24  1859 02/03/24  2012   PROBNP 5,655*  --    TROPHS 34* 34*     No results for input(s): \"LABA1C\" in the last 72 hours.  Recent Labs     02/03/24  1859   AST 16   ALT 12   BILITOT 2.1*   ALKPHOS 134*     Recent Labs     02/03/24  1859   INR 1.10   LACTA 1.8        Vasyl Whitmore MD

## 2024-02-04 NOTE — FLOWSHEET NOTE
Pt had lisinopril, norvasc, and coreg ordered for this am. Notified Berry Hall MD for confirmation if all should be given. Berry Hall MD relays to hold norvasc this am. Done.      02/04/24 0722   Vital Signs   Temp 97.5 °F (36.4 °C)   Temp Source Oral   Pulse 95   Heart Rate Source Monitor   Respirations 26   BP (!) 145/91   MAP (Calculated) 109   MAP (mmHg) 109   BP Location Right Arm

## 2024-02-04 NOTE — ED NOTES
ED SBAR report provider to ELIU Mix. Patient to be transported to Room 5250 via stretcher by  [facility] .Patient transported with bedside cardiac monitor. IV site clean, dry, and intact.  Updated patient on plan of care.

## 2024-02-04 NOTE — ED NOTES
Report given to ELIU Gómez at this time, all questions answered. Denies any further questions at this time.

## 2024-02-04 NOTE — CONSULTS
Referring Physician: * No referring provider recorded for this case *  Reason for Consultation: Shortness of breath, A-fib with RVR  Chief Complaint:       Subjective:   History of Present Illness:  Kaleb Mcclellan is a 68 y.o. patient with prior history of atrial fibrillation, systolic heart failure, hypertension, diabetes mellitus, hepatitis C who presented to the hospital with complaints of increasing shortness of breath for last 7 to 10 days.  He has stopped taking all his medications and his symptoms started to worsen.  He has also noticed edema as well as weight gain he presented to the emergency room and he was significantly hypertensive and has atrial fibrillation with a rate up to 150 briefly.  He denied having any chest tightness or pressure.  He is also not compliant with his fluid or sodium    I have been asked to provide consultation regarding further management and testing.    Review of Systems:   All 12 point review of symptoms completed. Pertinent positives identified in the HPI, all other review of symptoms negative as below.    Past Medical History:   has a past medical history of A-fib (HCC), Arthritis, Bone spur, DDD (degenerative disc disease), DM (diabetes mellitus) (HCC), ETOH abuse, Hepatitis C, HTN (hypertension), Internal hemorrhoids, OA (osteoarthritis), Sleep apnea, Smoker, and Spinal stenosis.  Congestive heart failure    Surgical History:   has no past surgical history on file.     Social History:   reports that he has been smoking cigarettes. He has a 45.0 pack-year smoking history. He does not have any smokeless tobacco history on file. He denies current alcohol use.  But had history of EtOH abuse in the past he reports current drug use. Drug: Marijuana (Weed).     Family History:  family history is not on file.      Home Medications:  Were reviewed and are listed in nursing record and/or below  Prior to Admission medications    Medication Sig Start Date End Date Taking?  Normal mood and affect. Alert and oriented x 4.   Neurologic: Normal gross motor and sensory exam.         Labs     CBC:   Lab Results   Component Value Date/Time    WBC 7.3 2024 06:29 AM    RBC 5.24 2024 06:29 AM    HGB 14.9 2024 06:29 AM    HCT 44.6 2024 06:29 AM    MCV 85.1 2024 06:29 AM    RDW 15.7 2024 06:29 AM     2024 06:29 AM     CMP:  Lab Results   Component Value Date/Time     2024 06:29 AM    K 3.2 2024 06:29 AM     2024 06:29 AM    CO2 30 2024 06:29 AM    BUN 13 2024 06:29 AM    CREATININE 1.1 2024 06:29 AM    GFRAA >60 2020 07:24 PM    GFRAA >60 2012 01:06 PM    AGRATIO 1.1 2024 06:59 PM    LABGLOM >60 2024 06:29 AM    GLUCOSE 109 2024 06:29 AM    PROT 7.5 2024 06:59 PM    PROT 6.7 2012 01:06 PM    CALCIUM 8.6 2024 06:29 AM    BILITOT 2.1 2024 06:59 PM    ALKPHOS 134 2024 06:59 PM    AST 16 2024 06:59 PM    ALT 12 2024 06:59 PM     PT/INR:  No results found for: \"PTINR\"  HgBA1c:  Lab Results   Component Value Date    LABA1C 5.2 10/23/2014     No results found for: \"CKTOTAL\", \"CKMB\", \"CKMBINDEX\", \"TROPONINI\"      Cardiac Data     Last EK/3/2024.  Personally reviewed                                   Atrial fibrillation with ventricular rate of 100 bpm    Echo: 2020 there is mild concentric left ventricular hypertrophy.   Global left ventricular function is moderate-to-severely decreased with   ejection fraction estimated from 25-30%   Indeterminate diastolic function due to AFIB.   The left atrium is severely dilated.   Moderately dilated right ventricle, with mild reduction in systolic function   small circumferential pericardial effusion       Stress Test nuclear stress test 2020:No myocardial perfusion defects. Dilated LV with reduced EF     Cath: Not available for review    Chest x-ray-congestive heart

## 2024-02-04 NOTE — PROGRESS NOTES
[facility] called stating patient heart rate had gotten down to 39 for abot 10 seconds. On arrival patient heart rate already back up to 90's, patient asymptomatic. Heart rate continuing to fluctuate but maintaining 80's-90's. Patient remains in A Fib on the monitor.

## 2024-02-04 NOTE — ED NOTES
Per MD, Cancel initially ordered fluids and change order to 500ml over 1 hour due to hx of CHF per historical chart.

## 2024-02-04 NOTE — ED NOTES
Patient requesting to lay back as he is feeling better and wants to rest. Patient then seen to desaturate to 87% on room air while turned left side. Patient placed on 2 lpm per nasal canula. MD aware.

## 2024-02-04 NOTE — PROGRESS NOTES
fall with injury in the past year?: Yes  ADL Assistance: Independent  Ambulation Assistance: Independent (Without assist device pta.)  Active : No (Relies on friends.)  Occupation: Retired  Type of Occupation: Retired : .  Leisure & Hobbies: reports drinking alcohol  IADL Comments: Pt appears very unkept.  Additional Comments: Nephew provides some assist with homemaking needs.  Vision/Hearing  Vision  Vision: Impaired  Vision Exceptions: Wears glasses at all times  Hearing  Hearing: Within functional limits    Cognition   Orientation  Overall Orientation Status: Within Functional Limits  Cognition  Overall Cognitive Status: WFL  Cognition Comment: Pt reports \"lazy\", not taking care of himself.   Poor hygiene care.     Objective           Gross Assessment  AROM: Generally decreased, functional  Strength: Generally decreased, functional                    Bed mobility  Supine to Sit: Independent  Sit to Supine: Independent  Transfers  Sit to Stand: Stand by assistance  Stand to Sit: Stand by assistance  Ambulation  Surface: Level tile  Device: No Device  Distance: Pt amb to/from bathroom, additional ~40' without assist device SBA/CGA.  Gait mildly antalgic due to chronic back pain.   No LE buckling/giving way; limited regard line awareness.     Balance  Sitting - Static: Good  Sitting - Dynamic: Good  Standing - Static: Good;-  Standing - Dynamic: Fair;+             AM-PAC - Mobility    AM-PAC Basic Mobility - Inpatient   How much help is needed turning from your back to your side while in a flat bed without using bedrails?: None  How much help is needed moving from lying on your back to sitting on the side of a flat bed without using bedrails?: None  How much help is needed moving to and from a bed to a chair?: A Little  How much help is needed standing up from a chair using your arms?: A Little  How much help is needed walking in hospital room?: A Little  How much help is needed climbing 3-5 steps with a

## 2024-02-04 NOTE — ED TRIAGE NOTES
Increased shortness of breath x1 week. Patient reports cough with yellow sputum which resolved around that time. Reports some chest soreness with cough. Also reports body spinning sensation x4 days which is worse with movement. Hx of COPD. Denies hx of stroke or cardiac problems.    Patient resting on bed, increased work of breathing with tachypnea in evidence. Patient also noted to be in Afib RVR w/ PVCs during triage. MD notified of patient presentation and to bedside for assessment.

## 2024-02-04 NOTE — PROGRESS NOTES
4 Eyes Skin Assessment     NAME:  Kaleb Mcclellan  YOB: 1955  MEDICAL RECORD NUMBER:  [id number]    The patient is being assessed for  Admission    I agree that at least one RN has performed a thorough Head to Toe Skin Assessment on the patient. ALL assessment sites listed below have been assessed.      Areas assessed by both nurses:    Head, Face, Ears, Shoulders, Back, Chest, Arms, Elbows, Hands, Sacrum. Buttock, Coccyx, Ischium, Legs. Feet and Heels, and Under Medical Devices         Does the Patient have a Wound? No noted wound(s)       Pb Prevention initiated by RN: No  Wound Care Orders initiated by RN: No    Pressure Injury (Stage 3,4, Unstageable, DTI, NWPT, and Complex wounds) if present, place Wound referral order by RN under : No    New Ostomies, if present place, Ostomy referral order under : No     Nurse 1 eSignature: Electronically signed by Charu Read RN on 2/3/24 at 11:27 PM EST    **SHARE this note so that the co-signing nurse can place an eSignature**    Nurse 2 eSignature: Electronically signed by Reyna Steinberg RN on 2/4/24 at 3:40 PM EST

## 2024-02-04 NOTE — PROGRESS NOTES
Notified Berry Hall MD that pt bp has been 90/60's this afternoon (MAP has been over 70) which is a drastic decrease from SBP in the high 100's since admission. Also informed Berry Hall MD that Dr Cristina seen pt,acknowledged BP, and changed the bp meds. Berry Hall MD relays to continue monitoring.       02/04/24 1215 02/04/24 1330   Vital Signs   Temp 97.8 °F (36.6 °C)  --    Temp Source Oral  --    Heart Rate Source Monitor  --    Respirations 22  --    BP 90/64 91/60  (recheck)   MAP (Calculated) 73  --    BP Location Left upper arm  --    BP Method Automatic  --    Patient Position Semi fowlers  --

## 2024-02-04 NOTE — ED PROVIDER NOTES
Value    RDW 15.7 (*)     MPV 12.8 (*)     Lymphocytes Absolute 0.9 (*)     All other components within normal limits   COMPREHENSIVE METABOLIC PANEL - Abnormal; Notable for the following components:    Glucose 124 (*)     Total Bilirubin 2.1 (*)     Alkaline Phosphatase 134 (*)     All other components within normal limits   TROPONIN - Abnormal; Notable for the following components:    Troponin, High Sensitivity 34 (*)     All other components within normal limits   BRAIN NATRIURETIC PEPTIDE - Abnormal; Notable for the following components:    Pro-BNP 5,655 (*)     All other components within normal limits   COVID-19, RAPID   RAPID INFLUENZA A/B ANTIGENS   CULTURE, BLOOD 1   CULTURE, BLOOD 2   LACTIC ACID   ETHANOL   URINALYSIS WITH REFLEX TO CULTURE   PROCALCITONIN       All other labs were within normal range or not returned as of this dictation.    EMERGENCY DEPARTMENT COURSE and DIFFERENTIAL DIAGNOSIS/MDM:   Vitals:    Vitals:    02/03/24 1928 02/03/24 1930 02/03/24 1932 02/03/24 1946   BP: (!) 177/111  (!) 170/107 (!) 200/155   Pulse: 91 86 80 (!) 114   Resp: (!) 36 (!) 34 (!) 35    Temp:       TempSrc:       SpO2:   95%    Weight:       Height:           Patient was given thefollowing medications:  Medications   magnesium sulfate 2000 mg in 50 mL IVPB premix (2,000 mg IntraVENous New Bag 2/3/24 1927)   sodium chloride 0.9 % bolus 500 mL (has no administration in time range)   furosemide (LASIX) injection 40 mg (40 mg IntraVENous Given 2/3/24 1928)   nitroglycerin (NITRO-BID) 2 % ointment 1 inch (1 inch Topical Given 2/3/24 1946)       ED COURSE & MEDICAL DECISION MAKING    Pertinent Labs & Imaging studies reviewed. (See chart for details)   -  Patient seen and evaluated in the emergency department.  -  Triage and nursing notes reviewed and incorporated.  -  Old chart records reviewed and incorporated.  -  Differential diagnosis includes: Differential Diagnosis: Acute Coronary Syndrome, Congestive Heart    DISPOSITION        PATIENT REFERREDTO:  No follow-up provider specified.    DISCHARGEMEDICATIONS:  New Prescriptions    No medications on file          (Please note that portions of this note were completed with a voice recognition program.  Efforts were made to edit the dictations but occasionally words are mis-transcribed.)    Eddy Martin MD (electronically signed)  Attending Emergency Physician          Eddy Martin MD  02/05/24 7732

## 2024-02-04 NOTE — FLOWSHEET NOTE
Pt found with O2 NC and pulse ox probe not on while eating. Pt having labored breathing. Replace pt pulse ox and pt found at 87% on RA. Replaced pt O2 and pt recovered to 93% quickly. Pt agrees to keep on his O2 and pulse ox.          02/04/24 0907 02/04/24 0910   Oxygen Therapy   SpO2 (!) 87 % 93 %   Pulse Oximetry Type Continuous Continuous   Pulse Oximeter Device Location Finger Finger   O2 Device None (Room air) None (Room air)   O2 Flow Rate (L/min)  --  2 L/min

## 2024-02-04 NOTE — PROGRESS NOTES
Severe compression deformity of L2 is again noted.     1.  Findings compatible diarrheal process.  Mild colonic distension is noted with mild mesenteric swirling but no evidence for volvulus.  This may be due to a partial obstructing process due to an underlying internal hernia.  No significant small bowel distension. 2.  Additional chronic and benign findings, as described.       CBC:   Recent Labs     02/03/24 1859 02/04/24  0629   WBC 8.1 7.3   HGB 16.4 14.9    124*     BMP:    Recent Labs     02/03/24  1859 02/04/24  0629    139   K 3.5 3.2*    100   CO2 26 30   BUN 15 13   CREATININE 1.2 1.1   GLUCOSE 124* 109*     Hepatic:   Recent Labs     02/03/24 1859   AST 16   ALT 12   BILITOT 2.1*   ALKPHOS 134*     Lipids:   Lab Results   Component Value Date/Time    CHOL 125 10/27/2016 05:00 PM    HDL 30 10/27/2016 05:00 PM    HDL 35 10/03/2011 03:05 PM    TRIG 76 10/27/2016 05:00 PM     Hemoglobin A1C:   Lab Results   Component Value Date/Time    LABA1C 5.2 10/23/2014 12:00 AM     TSH:   Lab Results   Component Value Date/Time    TSH 0.27 10/27/2016 05:00 PM     Troponin: No results found for: \"TROPONINT\"  Lactic Acid:   Recent Labs     02/03/24  1859   LACTA 1.8     BNP:   Recent Labs     02/03/24  1859   PROBNP 5,655*     UA:  Lab Results   Component Value Date/Time    NITRU Negative 02/03/2024 07:51 PM    COLORU Yellow 02/03/2024 07:51 PM    PHUR 6.0 02/03/2024 07:51 PM    WBCUA 0-2 02/03/2024 07:51 PM    RBCUA 3-4 02/03/2024 07:51 PM    MUCUS 1+ 02/03/2024 07:51 PM    BACTERIA Rare 02/03/2024 07:51 PM    CLARITYU Clear 02/03/2024 07:51 PM    SPECGRAV 1.020 02/03/2024 07:51 PM    LEUKOCYTESUR Negative 02/03/2024 07:51 PM    UROBILINOGEN 1.0 02/03/2024 07:51 PM    BILIRUBINUR Negative 02/03/2024 07:51 PM    BLOODU TRACE-INTACT 02/03/2024 07:51 PM    GLUCOSEU Negative 02/03/2024 07:51 PM    KETUA Negative 02/03/2024 07:51 PM     Urine Cultures: No results found for: \"LABURIN\"  Blood Cultures: No  Decision Maker/ POA  Documented      Personally reviewed Lab Studies and Imaging        Medical Decision Making:  The following items were considered in medical decision making:  Discussion of patient care with other providers  Reviewed clinical lab tests  Reviewed radiology tests  Reviewed other diagnostic tests/interventions  Independent review of radiologic images  Microbiology cultures and other micro tests reviewed        Electronically signed by Berry Hall MD on 2/4/2024 at 1:57 PM  Comment: Please note this report has been produced using speech recognition software and may contain errors related to that system including errors in grammar, punctuation, and spelling, as well as words and phrases that may be inappropriate. If there are any questions or concerns, please feel free to contact the dictating provider for clarification.

## 2024-02-04 NOTE — PROGRESS NOTES
Pt occasionally working harder to breath. Pt also takes of NC and forgets to put it back on. Notified Berry Hall MD of this and that pt is coughing more (but nothing is coming up) and his lung are sounding wet. Pt ordered duo-nebs.

## 2024-02-04 NOTE — PROGRESS NOTES
Occupational Therapy  Facility/Department: [address] PROGRESSIVE CARE  Occupational Therapy Initial Assessment    Name: Kaleb Mcclellan  : 1955  MRN: [id number]  Date of Service: 2024    Discharge Recommendations:  Home with Home health OT, Home with nursing aide, S Level 1, Home with assist PRN  OT Equipment Recommendations  Other: TBD--may need O2, reacher, wide SA     Kaleb Mcclellan scored a 21/24 on the AM-PAC ADL Inpatient form. Current research shows that an AM-PAC score of 18 or greater is typically associated with a discharge to the patient's home setting. Based on the patient's AM-PAC score, and their current ADL deficits, it is recommended that the patient have 2-3 sessions per week of Occupational Therapy at d/c to increase the patient's independence.  At this time, this patient demonstrates the endurance and safety to discharge home with HHOT and a follow up treatment frequency of 2-3x/wk.   Please see assessment section for further patient specific details.    If patient discharges prior to next session this note will serve as a discharge summary.  Please see below for the latest assessment towards goals.    Patient Diagnosis(es): The primary encounter diagnosis was Acute on chronic congestive heart failure, unspecified heart failure type (HCC). Diagnoses of Dyspnea, unspecified type and Atrial fibrillation with RVR (HCC) were also pertinent to this visit.  Past Medical History:  has a past medical history of A-fib (HCC), Arthritis, Bone spur, DDD (degenerative disc disease), DM (diabetes mellitus) (HCC), ETOH abuse, Hepatitis C, HTN (hypertension), Internal hemorrhoids, OA (osteoarthritis), Sleep apnea, Smoker, and Spinal stenosis.  Past Surgical History:  has no past surgical history on file.    Treatment Diagnosis: impaired endurance, ADLs, balance      Assessment   Performance deficits / Impairments: Decreased functional mobility ;Decreased endurance;Decreased ADL status;Decreased

## 2024-02-04 NOTE — PLAN OF CARE
Problem: Discharge Planning  Goal: Discharge to home or other facility with appropriate resources  2/4/2024 1118 by Reyna Steinberg RN  Outcome: Progressing  Flowsheets (Taken 2/4/2024 0910)  Discharge to home or other facility with appropriate resources:   Identify barriers to discharge with patient and caregiver   Arrange for needed discharge resources and transportation as appropriate   Refer to discharge planning if patient needs post-hospital services based on physician order or complex needs related to functional status, cognitive ability or social support system  2/4/2024 0412 by Charu Read RN  Outcome: Progressing     Problem: ABCDS Injury Assessment  Goal: Absence of physical injury  Outcome: Progressing  Flowsheets (Taken 2/4/2024 0907)  Absence of Physical Injury: Implement safety measures based on patient assessment     Problem: Safety - Adult  Goal: Free from fall injury  2/4/2024 1118 by Reyna Steinberg RN  Outcome: Progressing  Flowsheets (Taken 2/4/2024 0907)  Free From Fall Injury: Instruct family/caregiver on patient safety  2/4/2024 0412 by Charu Read RN  Outcome: Progressing     Problem: Respiratory - Adult  Goal: Achieves optimal ventilation and oxygenation  Outcome: Progressing  Flowsheets (Taken 2/4/2024 0910)  Achieves optimal ventilation and oxygenation:   Assess for changes in respiratory status   Assess for changes in mentation and behavior   Position to facilitate oxygenation and minimize respiratory effort   Oxygen supplementation based on oxygen saturation or arterial blood gases   Encourage broncho-pulmonary hygiene including cough, deep breathe, incentive spirometry   Assess and instruct to report shortness of breath or any respiratory difficulty   Respiratory therapy support as indicated     Problem: Cardiovascular - Adult  Goal: Maintains optimal cardiac output and hemodynamic stability  Outcome: Progressing  Flowsheets (Taken 2/4/2024 0910)  Maintains

## 2024-02-04 NOTE — PROGRESS NOTES
Pt arrived to floor via stretcher from ED and ambulated to bed. Telemetry activated and confirmed with [facility]. Patient oriented to room and use of call light. Call light and personal items within reach. Admission and assessment initiated. Denied further needs or questions at this time.

## 2024-02-05 ENCOUNTER — APPOINTMENT (OUTPATIENT)
Dept: GENERAL RADIOLOGY | Age: 69
End: 2024-02-05
Payer: MEDICARE

## 2024-02-05 LAB
ANION GAP SERPL CALCULATED.3IONS-SCNC: 10 MMOL/L (ref 3–16)
BASE EXCESS BLDA CALC-SCNC: 4.9 MMOL/L (ref -3–3)
BASE EXCESS BLDV CALC-SCNC: 7.1 MMOL/L
BASE EXCESS BLDV CALC-SCNC: 8.7 MMOL/L
BUN SERPL-MCNC: 18 MG/DL (ref 7–20)
CALCIUM SERPL-MCNC: 9 MG/DL (ref 8.3–10.6)
CHLORIDE SERPL-SCNC: 97 MMOL/L (ref 99–110)
CO2 BLDA-SCNC: 36.4 MMOL/L
CO2 BLDV-SCNC: 37 MMOL/L
CO2 BLDV-SCNC: 39 MMOL/L
CO2 SERPL-SCNC: 32 MMOL/L (ref 21–32)
COHGB MFR BLDA: 1.5 % (ref 0–1.5)
COHGB MFR BLDV: 1.5 %
COHGB MFR BLDV: 1.5 %
CREAT SERPL-MCNC: 1.2 MG/DL (ref 0.8–1.3)
DEPRECATED RDW RBC AUTO: 15.4 % (ref 12.4–15.4)
GFR SERPLBLD CREATININE-BSD FMLA CKD-EPI: >60 ML/MIN/{1.73_M2}
GLUCOSE SERPL-MCNC: 107 MG/DL (ref 70–99)
HCO3 BLDA-SCNC: 34.2 MMOL/L (ref 21–29)
HCO3 BLDV-SCNC: 35 MMOL/L (ref 23–29)
HCO3 BLDV-SCNC: 37 MMOL/L (ref 23–29)
HCT VFR BLD AUTO: 43.3 % (ref 40.5–52.5)
HGB BLD-MCNC: 14.5 G/DL (ref 13.5–17.5)
HGB BLDA-MCNC: 14.8 G/DL (ref 13.5–17.5)
MCH RBC QN AUTO: 28.3 PG (ref 26–34)
MCHC RBC AUTO-ENTMCNC: 33.5 G/DL (ref 31–36)
MCV RBC AUTO: 84.6 FL (ref 80–100)
METHGB MFR BLDA: 0.1 %
METHGB MFR BLDV: 0.3 %
METHGB MFR BLDV: 0.4 %
O2 CT VFR BLDA CALC: 20 ML/DL
O2 THERAPY: ABNORMAL
PCO2 BLDA: 71.4 MMHG (ref 35–45)
PCO2 BLDV: 62.2 MMHG (ref 40–50)
PCO2 BLDV: 67.1 MMHG (ref 40–50)
PH BLDA: 7.29 [PH] (ref 7.35–7.45)
PH BLDV: 7.35 [PH] (ref 7.35–7.45)
PH BLDV: 7.36 [PH] (ref 7.35–7.45)
PLATELET # BLD AUTO: 131 K/UL (ref 135–450)
PLATELET BLD QL SMEAR: ABNORMAL
PMV BLD AUTO: 13.1 FL (ref 5–10.5)
PO2 BLDA: 102 MMHG (ref 75–108)
PO2 BLDV: 39 MMHG
PO2 BLDV: 55 MMHG
POTASSIUM SERPL-SCNC: 3.4 MMOL/L (ref 3.5–5.1)
RBC # BLD AUTO: 5.12 M/UL (ref 4.2–5.9)
SAO2 % BLDA: 97.9 %
SAO2 % BLDV: 70 %
SAO2 % BLDV: 87 %
SLIDE REVIEW: ABNORMAL
SODIUM SERPL-SCNC: 139 MMOL/L (ref 136–145)
WBC # BLD AUTO: 6.3 K/UL (ref 4–11)

## 2024-02-05 PROCEDURE — 6370000000 HC RX 637 (ALT 250 FOR IP): Performed by: INTERNAL MEDICINE

## 2024-02-05 PROCEDURE — 82803 BLOOD GASES ANY COMBINATION: CPT

## 2024-02-05 PROCEDURE — 99233 SBSQ HOSP IP/OBS HIGH 50: CPT | Performed by: INTERNAL MEDICINE

## 2024-02-05 PROCEDURE — 94640 AIRWAY INHALATION TREATMENT: CPT

## 2024-02-05 PROCEDURE — 2060000000 HC ICU INTERMEDIATE R&B

## 2024-02-05 PROCEDURE — 6370000000 HC RX 637 (ALT 250 FOR IP): Performed by: STUDENT IN AN ORGANIZED HEALTH CARE EDUCATION/TRAINING PROGRAM

## 2024-02-05 PROCEDURE — 36600 WITHDRAWAL OF ARTERIAL BLOOD: CPT

## 2024-02-05 PROCEDURE — 6360000002 HC RX W HCPCS: Performed by: INTERNAL MEDICINE

## 2024-02-05 PROCEDURE — C8929 TTE W OR WO FOL WCON,DOPPLER: HCPCS

## 2024-02-05 PROCEDURE — 94761 N-INVAS EAR/PLS OXIMETRY MLT: CPT

## 2024-02-05 PROCEDURE — 2580000003 HC RX 258: Performed by: INTERNAL MEDICINE

## 2024-02-05 PROCEDURE — 6360000004 HC RX CONTRAST MEDICATION: Performed by: INTERNAL MEDICINE

## 2024-02-05 PROCEDURE — 80048 BASIC METABOLIC PNL TOTAL CA: CPT

## 2024-02-05 PROCEDURE — 2700000000 HC OXYGEN THERAPY PER DAY

## 2024-02-05 PROCEDURE — 6370000000 HC RX 637 (ALT 250 FOR IP): Performed by: NURSE PRACTITIONER

## 2024-02-05 PROCEDURE — 71045 X-RAY EXAM CHEST 1 VIEW: CPT

## 2024-02-05 PROCEDURE — 85027 COMPLETE CBC AUTOMATED: CPT

## 2024-02-05 PROCEDURE — 94660 CPAP INITIATION&MGMT: CPT

## 2024-02-05 PROCEDURE — 36415 COLL VENOUS BLD VENIPUNCTURE: CPT

## 2024-02-05 RX ORDER — GABAPENTIN 100 MG/1
200 CAPSULE ORAL 3 TIMES DAILY
Status: DISCONTINUED | OUTPATIENT
Start: 2024-02-05 | End: 2024-02-10 | Stop reason: HOSPADM

## 2024-02-05 RX ORDER — POTASSIUM CHLORIDE 750 MG/1
40 TABLET, FILM COATED, EXTENDED RELEASE ORAL 2 TIMES DAILY
Status: DISCONTINUED | OUTPATIENT
Start: 2024-02-05 | End: 2024-02-10 | Stop reason: HOSPADM

## 2024-02-05 RX ORDER — SPIRONOLACTONE 25 MG/1
25 TABLET ORAL DAILY
Status: DISCONTINUED | OUTPATIENT
Start: 2024-02-06 | End: 2024-02-10 | Stop reason: HOSPADM

## 2024-02-05 RX ORDER — SPIRONOLACTONE 25 MG/1
12.5 TABLET ORAL DAILY
Status: DISCONTINUED | OUTPATIENT
Start: 2024-02-05 | End: 2024-02-05

## 2024-02-05 RX ORDER — METOPROLOL SUCCINATE 50 MG/1
50 TABLET, EXTENDED RELEASE ORAL DAILY
Status: DISCONTINUED | OUTPATIENT
Start: 2024-02-05 | End: 2024-02-10 | Stop reason: HOSPADM

## 2024-02-05 RX ADMIN — MOMETASONE FUROATE AND FORMOTEROL FUMARATE DIHYDRATE 2 PUFF: 200; 5 AEROSOL RESPIRATORY (INHALATION) at 20:57

## 2024-02-05 RX ADMIN — ALPRAZOLAM 1 MG: 0.5 TABLET ORAL at 10:41

## 2024-02-05 RX ADMIN — APIXABAN 5 MG: 5 TABLET, FILM COATED ORAL at 10:41

## 2024-02-05 RX ADMIN — SPIRONOLACTONE 12.5 MG: 25 TABLET ORAL at 10:46

## 2024-02-05 RX ADMIN — POTASSIUM CHLORIDE 40 MEQ: 1500 TABLET, EXTENDED RELEASE ORAL at 10:45

## 2024-02-05 RX ADMIN — FUROSEMIDE 60 MG: 10 INJECTION, SOLUTION INTRAMUSCULAR; INTRAVENOUS at 05:55

## 2024-02-05 RX ADMIN — GABAPENTIN 200 MG: 100 CAPSULE ORAL at 20:24

## 2024-02-05 RX ADMIN — FERROUS SULFATE TAB 325 MG (65 MG ELEMENTAL FE) 325 MG: 325 (65 FE) TAB at 10:41

## 2024-02-05 RX ADMIN — PERFLUTREN 1.5 ML: 6.52 INJECTION, SUSPENSION INTRAVENOUS at 11:56

## 2024-02-05 RX ADMIN — Medication 10 ML: at 20:26

## 2024-02-05 RX ADMIN — Medication 10 ML: at 10:41

## 2024-02-05 RX ADMIN — METOPROLOL SUCCINATE 50 MG: 50 TABLET, EXTENDED RELEASE ORAL at 10:46

## 2024-02-05 RX ADMIN — FUROSEMIDE 60 MG: 10 INJECTION, SOLUTION INTRAMUSCULAR; INTRAVENOUS at 19:54

## 2024-02-05 RX ADMIN — EMPAGLIFLOZIN 10 MG: 10 TABLET, FILM COATED ORAL at 10:45

## 2024-02-05 RX ADMIN — CITALOPRAM HYDROBROMIDE 20 MG: 20 TABLET ORAL at 10:41

## 2024-02-05 RX ADMIN — APIXABAN 5 MG: 5 TABLET, FILM COATED ORAL at 20:24

## 2024-02-05 RX ADMIN — POTASSIUM CHLORIDE 40 MEQ: 750 TABLET, FILM COATED, EXTENDED RELEASE ORAL at 20:24

## 2024-02-05 RX ADMIN — FERROUS SULFATE TAB 325 MG (65 MG ELEMENTAL FE) 325 MG: 325 (65 FE) TAB at 19:53

## 2024-02-05 ASSESSMENT — PAIN SCALES - GENERAL: PAINLEVEL_OUTOF10: 0

## 2024-02-05 NOTE — PROGRESS NOTES
VBG improved after approximately 1 hour on bipap     Latest Reference Range & Units 02/05/24 07:40   pH, [place] 7.350 - 7.450  7.358   pCO2, [place] 40.0 - 50.0 mmHg 62.2 (H)   pO2, [place] Not Established mmHg 55   HCO3, Venous 23 - 29 mmol/L 35 (H)   TC02 (Calc), [place] Not Established mmol/L 37   Base Excess, [place] Not Established mmol/L 7.1   MetHgb, [place] <1.5 % 0.4   O2 Sat, [place] Not Established % 87   (H): Data is abnormally high

## 2024-02-05 NOTE — PLAN OF CARE
Problem: Discharge Planning  Goal: Discharge to home or other facility with appropriate resources  2/5/2024 1548 by Reyna Steinberg RN  Outcome: Progressing  Flowsheets (Taken 2/5/2024 0800)  Discharge to home or other facility with appropriate resources:   Identify barriers to discharge with patient and caregiver   Refer to discharge planning if patient needs post-hospital services based on physician order or complex needs related to functional status, cognitive ability or social support system  2/5/2024 0509 by Charu Read, RN  Outcome: Progressing  Flowsheets (Taken 2/4/2024 2024)  Discharge to home or other facility with appropriate resources:   Identify barriers to discharge with patient and caregiver   Identify discharge learning needs (meds, wound care, etc)     Problem: ABCDS Injury Assessment  Goal: Absence of physical injury  Outcome: Progressing  Flowsheets (Taken 2/5/2024 0800)  Absence of Physical Injury: Implement safety measures based on patient assessment     Problem: Safety - Adult  Goal: Free from fall injury  2/5/2024 1548 by Reyna Steinberg RN  Outcome: Progressing  Flowsheets (Taken 2/5/2024 0800)  Free From Fall Injury: Instruct family/caregiver on patient safety  2/5/2024 0509 by Charu Read RN  Outcome: Progressing     Problem: Respiratory - Adult  Goal: Achieves optimal ventilation and oxygenation  2/5/2024 1548 by Reyna Steinberg RN  Outcome: Progressing  Flowsheets (Taken 2/5/2024 0800)  Achieves optimal ventilation and oxygenation:   Assess for changes in respiratory status   Assess for changes in mentation and behavior   Position to facilitate oxygenation and minimize respiratory effort   Oxygen supplementation based on oxygen saturation or arterial blood gases   Initiate smoking cessation protocol as indicated   Encourage broncho-pulmonary hygiene including cough, deep breathe, incentive spirometry   Assess and instruct to report shortness of breath or any  respiratory difficulty   Respiratory therapy support as indicated  2/5/2024 0509 by Charu Read, RN  Outcome: Progressing  Flowsheets (Taken 2/4/2024 2024)  Achieves optimal ventilation and oxygenation:   Assess for changes in respiratory status   Assess for changes in mentation and behavior   Position to facilitate oxygenation and minimize respiratory effort   Oxygen supplementation based on oxygen saturation or arterial blood gases     Problem: Cardiovascular - Adult  Goal: Maintains optimal cardiac output and hemodynamic stability  2/5/2024 1548 by Reyna Steinberg RN  Outcome: Progressing  Flowsheets (Taken 2/5/2024 0800)  Maintains optimal cardiac output and hemodynamic stability:   Monitor blood pressure and heart rate   Monitor urine output and notify Licensed Independent Practitioner for values outside of normal range   Assess for signs of decreased cardiac output   Administer vasoactive medications as ordered  2/5/2024 0509 by Charu Read, RN  Outcome: Progressing

## 2024-02-05 NOTE — PROGRESS NOTES
Soft, non-tender, Normal bowel sounds,  No organomegaly  Extremities: No Cyanosis or Clubbing; Edema 2+  Neurological: Oriented to time, place, and person, Non-anxious  Psychiatric: Normal mood and affect  Skin: Warm and dry,  No rash seen    Current Facility-Administered Medications: ALPRAZolam (XANAX) tablet 1 mg, 1 mg, Oral, TID PRN  apixaban (ELIQUIS) tablet 5 mg, 5 mg, Oral, BID  furosemide (LASIX) injection 60 mg, 60 mg, IntraVENous, BID  [Held by provider] lisinopril (PRINIVIL;ZESTRIL) tablet 20 mg, 20 mg, Oral, Daily  albuterol (PROVENTIL) (2.5 MG/3ML) 0.083% nebulizer solution 2.5 mg, 2.5 mg, Nebulization, Q4H PRN  ipratropium (ATROVENT) 0.02 % nebulizer solution 0.5 mg, 0.5 mg, Nebulization, Q4H PRN  albuterol sulfate HFA (PROVENTIL;VENTOLIN;PROAIR) 108 (90 Base) MCG/ACT inhaler 2 puff, 2 puff, Inhalation, Q6H PRN  mometasone-formoterol (DULERA) 200-5 MCG/ACT inhaler 2 puff, 2 puff, Inhalation, BID RT  carvedilol (COREG) tablet 25 mg, 25 mg, Oral, BID WC  citalopram (CELEXA) tablet 20 mg, 20 mg, Oral, Daily  ferrous sulfate (IRON 325) tablet 325 mg, 325 mg, Oral, BID WC  gabapentin (NEURONTIN) capsule 600 mg, 600 mg, Oral, TID  sodium chloride flush 0.9 % injection 5-40 mL, 5-40 mL, IntraVENous, 2 times per day  sodium chloride flush 0.9 % injection 5-40 mL, 5-40 mL, IntraVENous, PRN  0.9 % sodium chloride infusion, , IntraVENous, PRN  potassium chloride (KLOR-CON M) extended release tablet 40 mEq, 40 mEq, Oral, PRN **OR** potassium bicarb-citric acid (EFFER-K) effervescent tablet 40 mEq, 40 mEq, Oral, PRN **OR** potassium chloride 10 mEq/100 mL IVPB (Peripheral Line), 10 mEq, IntraVENous, PRN  magnesium sulfate 2000 mg in 50 mL IVPB premix, 2,000 mg, IntraVENous, PRN  ondansetron (ZOFRAN-ODT) disintegrating tablet 4 mg, 4 mg, Oral, Q8H PRN **OR** ondansetron (ZOFRAN) injection 4 mg, 4 mg, IntraVENous, Q6H PRN  polyethylene glycol (GLYCOLAX) packet 17 g, 17 g, Oral, Daily PRN  acetaminophen (TYLENOL)  tablet 650 mg, 650 mg, Oral, Q6H PRN **OR** acetaminophen (TYLENOL) suppository 650 mg, 650 mg, Rectal, Q6H PRN  perflutren lipid microspheres (DEFINITY) injection 1.5 mL, 1.5 mL, IntraVENous, ONCE PRN  nicotine (NICODERM CQ) 21 MG/24HR 1 patch, 1 patch, TransDERmal, Daily         Labs:   Recent Labs     02/04/24  0629 02/05/24  0740   WBC 7.3 6.3   HGB 14.9 14.5   HCT 44.6 43.3   * 131*     Recent Labs     02/04/24  0629 02/05/24  0740    139   K 3.2* 3.4*   CO2 30 32   BUN 13 18   CREATININE 1.1 1.2   LABGLOM >60 >60     No results for input(s): \"BNP\" in the last 72 hours.  Recent Labs     02/03/24  1859   PROTIME 14.2   INR 1.10     No results for input(s): \"CKTOTAL\", \"CKMB\", \"CKMBINDEX\", \"TROPONINI\" in the last 72 hours.      Telemetry Monitor:       EKG: A-fib with RVR        Chest X-Ray:  Worsening atelectasis or consolidation in the right lung base.  Cardiomegaly with persistent vascular congestion changes and bilateral lower lobe predominantly parenchymal opacities  Suspected trace bilateral pleural effusions    ECHO: 2/2020  Mild LVH; EF 25-30%; indeterminate diastolic function  Left atrium severely dilated  Moderately dilated RV with mild reduction in systolic function  Small pericardial effusion       Echo: 2/5/2024   Normal left ventricle size, mild concentric wall thickness, and systolic   function with an estimated ejection fraction of 15-20%.   Global hypokinesis   Moderate to severe eccentric mitral regurgitation is present.   The left atrium is severely dilated.   Normal right ventricular size and function.      ASSESSMENT AND PLAN:         Acute on chronic systolic heart failure: NYHA class IV on admission  Patient is noncompliant with his medicines and has stopped taking everything  Getting IV Lasix now  Repeat echo shows EF to be 15 to 20% with severe global hypokinesis and moderate to severe MR  Patient is on Jardiance Toprol-XL spironolactone.  Lisinopril was on hold  Will resume

## 2024-02-05 NOTE — PROGRESS NOTES
Patient being monitored and not appearing to improve, continues to remain on non rebreather at this time. Perfect serve message sent to Phan Roa MD explaining situation. Given okay for verbal orders for portable CXR and ABG to be drawn.

## 2024-02-05 NOTE — PROGRESS NOTES
[facility] called to relay pt O2 sat in the low 80's. Went to jesse pt and found sleeping in chair mouth breathing. Awaken pt and his sat's recovered. Call Mady RT to see if we can use a mask while he slept or if he needed to go back on BIPAP. Mady relays pt to go back on BIPAP. Merary to come put him back on.       02/05/24 1430 02/05/24 1435 02/05/24 1437   Vital Signs   Pulse 74 86 79   Heart Rate Source Monitor Monitor Monitor   Respirations (!) 34 (!) 34 (!) 32   Oxygen Therapy   SpO2 (!) 85 % (!) 88 % 90 %   Pulse Oximetry Type Continuous Continuous Continuous   Pulse via Oximetry 70 beats per minute  --  81 beats per minute   Pulse Oximeter Device Mode Continuous Continuous Continuous   O2 Device Nasal cannula Nasal cannula Nasal cannula   O2 Flow Rate (L/min) 8 L/min 8 L/min 8 L/min

## 2024-02-05 NOTE — FLOWSHEET NOTE
Notified Berry Hall MD, pt had coreg 25 mg PO and lasix 60 mg IV due at this time. Berry Hall MD relay ok to hold this dose of coreg.     02/04/24 1743   Vital Signs   Temp 98.1 °F (36.7 °C)   Temp Source Oral   Pulse 85   Heart Rate Source Monitor   Respirations 22   /83   MAP (Calculated) 92   MAP (mmHg) 93   BP Location Left lower arm   BP Method Automatic   Patient Position Semi fowlers

## 2024-02-05 NOTE — PROGRESS NOTES
V2.[place]    [facility] [place]      Name:  Kaleb Mcclellan /Age/Sex: 1955  (68 y.o. male)   MRN & CSN:  [id number] & [id number] Encounter Date/Time: 2024 8:57 AM EDT   Location:  [id number] PCP: Enio Davalos MD     Attending:Berry Hall MD       Hospital Day: 3      HPI :   Chief Complaint: SOB.    Kaleb Mcclellan is a 68 y.o. male who presents with MHx significant for A-fib, congestive heart failure with ejection fraction of 30%, diabetes mellitus type 2, previous alcohol use, hepatitis C, hypertension, tobacco use.  Patient presented to the emergency room as he has not been feeling well for the Last couple of weeks and stopped taking all his medication at home, he presented with fatigue weakness and some palpitation and chest discomfort with high dyspnea, was found to be hypoxic with pulse ox of 87% required placing him on 2 L nasal cannula, also blood pressure was elevated at 214/163 as patient has not been taking any of his medication for the last couple of weeks.  He was found to be in A-fib with RVR with heart rate up to 150 but then it will slow down and will come down to the 80s on his own     Subjective:   Hypercapnia noted last night.   Patient is on BiPAP this morning, breathing comfortably.  Output 1.5 liters in the last 24 hours.   Review of Systems:      Pertinent positives and negatives discussed in HPI    Objective:     Intake/Output Summary (Last 24 hours) at 2024 1440  Last data filed at 2024 1200  Gross per 24 hour   Intake 970 ml   Output 2620 ml   Net -1650 ml        Vitals:   Vitals:    24 1325 24 1430 24 1435 24 1437   BP:       Pulse: 95 74 86 79   Resp: 26 (!) 34 (!) 34 (!) 32   Temp:       TempSrc:       SpO2: 93% (!) 85% (!) 88% 90%   Weight:       Height:             Physical Exam:      Physical Exam Performed:    /89   Pulse 79   Temp 97.6 °F (36.4 °C) (Oral)   Resp (!) 32   Ht 1.727 m (5' 8\")   Wt 118 kg (260  lb 2.3 oz)   SpO2 90%   BMI 39.55 kg/m²     General appearance: on BiPAP, comfortable   HEENT: Pupils equal, round, and reactive to light. Conjunctivae/corneas clear.  Neck: Supple, with full range of motion. No jugular venous distention. Trachea midline.  Respiratory:  Normal respiratory effort. Clear to auscultation, bilaterally without Rales/Wheezes/Rhonchi.  Cardiovascular: Regular rate and rhythm with normal S1/S2 without murmurs, rubs or gallops.  Abdomen: Soft, non-tender, non-distended with normal bowel sounds.  Musculoskeletal: mimimal lower limb edema.   Skin: Skin color, texture, turgor normal.  No rashes or lesions.  Neurologic:  Neurovascularly intact without any focal sensory/motor deficits. Cranial nerves: II-XII intact, grossly non-focal.  Psychiatric: Alert and oriented, thought content appropriate, normal insight  Capillary Refill: Brisk, 3 seconds, normal   Peripheral Pulses: +2 palpable, equal bilaterally       Medications:   Medications:    gabapentin  200 mg Oral TID    empagliflozin  10 mg Oral Daily    spironolactone  12.5 mg Oral Daily    metoprolol succinate  50 mg Oral Daily    apixaban  5 mg Oral BID    furosemide  60 mg IntraVENous BID    [Held by provider] lisinopril  20 mg Oral Daily    mometasone-formoterol  2 puff Inhalation BID RT    citalopram  20 mg Oral Daily    ferrous sulfate  325 mg Oral BID WC    sodium chloride flush  5-40 mL IntraVENous 2 times per day    nicotine  1 patch TransDERmal Daily      Infusions:    sodium chloride       PRN Meds: ALPRAZolam, 1 mg, TID PRN  albuterol, 2.5 mg, Q4H PRN  ipratropium, 0.5 mg, Q4H PRN  albuterol sulfate HFA, 2 puff, Q6H PRN  sodium chloride flush, 5-40 mL, PRN  sodium chloride, , PRN  potassium chloride, 40 mEq, PRN   Or  potassium alternative oral replacement, 40 mEq, PRN   Or  potassium chloride, 10 mEq, PRN  magnesium sulfate, 2,000 mg, PRN  ondansetron, 4 mg, Q8H PRN   Or  ondansetron, 4 mg, Q6H PRN  polyethylene glycol, 17 g,

## 2024-02-05 NOTE — DISCHARGE INSTRUCTIONS
Additional sites of heart failure:     https://Neural AnalyticsitalTrovebox.com/publication/?s=058159  --- this is the American Heart Association's interactive guide to healthier living with heart failure .  Click on the hyperlink or copy/paste the link into the search bar. Use your mouse to scroll through the pages.  Lots of information on weight management, tips on diets, activity, medications, etc    HF [place] Ivania: This is a free smartphone ivania available for download on iPhone and Android.  Use your phone to track sodium/fluid intake, track zone tool symptoms, weights, medications, etc. Click on this HF [place] [employer]     hyperlink to get a QR code for easy download--.search for it in your ivania store                                          DASH (Dietary Approach to Stop Hypertension)  Diet - https://www.nhlbi.nih.gov/education/dash-eating-plan - this diet is a flexible eating plan that promotes a heart-healthy eating style.  Click on the hyperlink or copy/paste the link into the search bar.  Lots of low-sodium recipes and tips.    https://www.TrackTik.Handseeing Information/recipes -- more free recipes

## 2024-02-05 NOTE — FLOWSHEET NOTE
Reassessed pt to see if O2 can be tuned down. Pt sating in the low 90's. Did not adjust. Will re-eval.       02/05/24 1322 02/05/24 1325   Vital Signs   Pulse 96 95   Heart Rate Source Monitor Monitor   Respirations 20 26   Oxygen Therapy   SpO2 91 % 93 %   Pulse Oximetry Type Continuous Continuous   Pulse via Oximetry 72 beats per minute 93 beats per minute   Pulse Oximeter Device Mode Continuous Continuous   O2 Flow Rate (L/min) 8 L/min 8 L/min

## 2024-02-05 NOTE — FLOWSHEET NOTE
Gave pt flutter valve. Educated on use and it's benefits. Pt enthused to use it and agrees to do it often.

## 2024-02-05 NOTE — PROGRESS NOTES
ABG resulted. MD perfect serve messaged with results of ph of 7.289 and CO2 of 71.4.    Orders placed for biPAP.

## 2024-02-05 NOTE — PLAN OF CARE
Problem: Discharge Planning  Goal: Discharge to home or other facility with appropriate resources  Outcome: Progressing  Flowsheets (Taken 2/4/2024 2024)  Discharge to home or other facility with appropriate resources:   Identify barriers to discharge with patient and caregiver   Identify discharge learning needs (meds, wound care, etc)     Problem: Safety - Adult  Goal: Free from fall injury  Outcome: Progressing     Problem: Respiratory - Adult  Goal: Achieves optimal ventilation and oxygenation  Outcome: Progressing  Flowsheets (Taken 2/4/2024 2024)  Achieves optimal ventilation and oxygenation:   Assess for changes in respiratory status   Assess for changes in mentation and behavior   Position to facilitate oxygenation and minimize respiratory effort   Oxygen supplementation based on oxygen saturation or arterial blood gases     Problem: Cardiovascular - Adult  Goal: Maintains optimal cardiac output and hemodynamic stability  Outcome: Progressing

## 2024-02-05 NOTE — PROGRESS NOTES
Patient remained on nasal cannula and desatting when asleep. Patient increased to 4 then 5L. Continuing to maintain mid 80's. Appearing that patient is mouth breathing when asleep. Patient placed on non rebreather due to this, now maintaining 94-96%. Perfect serve message sent to Chiara Powell NP regarding patient possibly needing CPAP/BiPAP for sleeping due to sleep apnea. Also messaged regarding patient appearing more lethargic than before so possible VBG/ABG being needed. No new orders received.

## 2024-02-05 NOTE — PROGRESS NOTES
Lauryn Charge RN notified me that the tele monitor at desk showed pt O2 sat in the low 80's @1148. Pt down at echo at this time. Called echo and  to get a nurse to check pt. O2 sat returned to normal from what I can see on the tele at the desk on unit.    On report once pt done w/echo and ready to return, they placed pt up to 8L. I had to go and escort pt/transporter back to the unit because of the high O2 liter. Pt found to be at 97% when I got there and maintained sats within normal limits on trip back.    Encouraged pt to sit in chair for lunch. Pt up in chair still on 8L. Mady LIRA notified of this change. Will wean pt down.

## 2024-02-05 NOTE — PROGRESS NOTES
Pt called wanting to get off BIPAP and get something to drink. Encouraged pt to stay on longer and that I would reach out to Berry Hall MD to see if pt Xanax could be converted to something IV for his anxiety. Pt wanted it off at that moment. Took him off and called Mady LIRA about what she wanted pt to be on for the O2 NC. Mady relays start at 4L; done. Also asked her how long pt needed to be off if I were to give him anything PO (wanted to give him the PO xanax , something to drink, and other am meds, if he was going to be off bipap for an extended amount of time). Mady say pt needs to be off at least 30 mins after PO intake if going back on BIPAP. Mady also relays pt 2nd ABG was much improved.    Notified Berry Hall MD about the improved ABG and if it was ok for pt to have a diet while he's off bipap at that time or get Ativan IV if not so pt can suzette bipap. Berry Hall MD relays pt can have diet and use bipap nocturnally at this point d/t to improved ABG.     Educated pt of the above. Pt on 4L NC, diet ordered, and am PO meds given. Pt still wanted the Xanax as well; given.        02/05/24 1030 02/05/24 1100   Vital Signs   Temp Source  --  Oral   Pulse 92 88   Heart Rate Source  --  Monitor   Respirations 20 22   BP  --  127/89   MAP (Calculated)  --  102   MAP (mmHg)  --  98   BP Location  --  Left upper arm   BP Method  --  Automatic   Patient Position  --  High fowlers   Pain Assessment   Pain Assessment  --  None - Denies Pain   Opioid-Induced Sedation   POSS Score  --  1   RASS   Cmgill Agitation Sedation Scale (RASS) +1 0   Oxygen Therapy   SpO2 99 % 97 %   Pulse Oximetry Type Continuous Continuous   Pulse via Oximetry 92 beats per minute 74 beats per minute   Pulse Oximeter Device Mode Continuous Continuous   O2 Device Nasal cannula Nasal cannula   O2 Flow Rate (L/min) (S)  4 L/min  (off bipap and NC reapplied; see note) 4 L/min

## 2024-02-05 NOTE — PROGRESS NOTES
Called by nurse at 4:47 AM.  Patient with escalating O2 requirement when sleeping.  Stat portable chest x-ray significant for pulmonary edema  Requested that nursing give dose of furosemide 60 mg IV now  ABG pending

## 2024-02-05 NOTE — CARE COORDINATION
Case Management Assessment  Initial Evaluation    Date/Time of Evaluation: 2/5/2024 10:32 AM  Assessment Completed by: Eden Vasquez RN    If patient is discharged prior to next notation, then this note serves as note for discharge by case management.    Patient Name: Kaleb Mcclellan                   YOB: 1955  Diagnosis: Atrial fibrillation with RVR (HCC) [I48.91]  Acute on chronic systolic CHF (congestive heart failure) (HCC) [I50.23]  Dyspnea, unspecified type [R06.00]  Acute on chronic congestive heart failure, unspecified heart failure type (HCC) [I50.9]                   Date / Time: 2/3/2024  6:41 PM    Patient Admission Status: Inpatient   Readmission Risk (Low < 19, Mod (19-27), High > 27): Readmission Risk Score: 9.7    Current PCP: Enio Davalos MD  PCP verified by CM? Yes    Chart Reviewed: Yes      History Provided by: Patient  Patient Orientation: Alert and Oriented    Patient Cognition: Alert    Hospitalization in the last 30 days (Readmission):  No    If yes, Readmission Assessment in  Navigator will be completed.    Advance Directives:      Code Status: Full Code   Patient's Primary Decision Maker is: Legal Next of Kin      Discharge Planning:    Patient lives with: Alone (but currently staying with his girlfriend) Type of Home: Apartment  Primary Care Giver: Self  Patient Support Systems include: Spouse/Significant Other, Family Members   Current Financial resources: Medicare, Medicaid  Current community resources: None  Current services prior to admission: None            Current DME:              Type of Home Care services:  None    ADLS  Prior functional level: Independent in ADLs/IADLs  Current functional level: Assistance with the following:, Mobility    PT AM-PAC: 20 /24  OT AM-PAC: 21 /24    Family can provide assistance at DC: Yes  Would you like Case Management to discuss the discharge plan with any other family members/significant others, and if so, who?  No  Plans to Return to Present Housing: Yes  Other Identified Issues/Barriers to RETURNING to current housing: no barriers  Potential Assistance needed at discharge: N/A            Potential DME:    Patient expects to discharge to: Other (comment) (girlfriend's house)  Plan for transportation at discharge: Friends    Financial    Payor: RONNIE GILES / Plan: RONNIE GILES DUAL / Product Type: *No Product type* /     Does insurance require precert for SNF: Yes    Potential assistance Purchasing Medications: No  Meds-to-Beds request:        [place], [place] - [address] [facility] -  [phone] - F [phone]  [address] [facility] [address]  Phone: [phone] Fax: [phone]    [facility] [id number] [place], [place] - [address] [facility] [phone] - F [phone]  [address] [facility] [address]  Phone: [phone] Fax: [phone]    [id number] [place], [place] - [address] - P [phone] - F [phone]  [address] [facility] [address]  Phone: [phone] Fax: [phone]      Notes:    Factors facilitating achievement of predicted outcomes: Family support, Friend support, Cooperative, and Pleasant    Barriers to discharge: Decreased endurance    Additional Case Management Notes: Patient says he will discharge to his girlfriend's house. Denies home needs.    The Plan for Transition of Care is related to the following treatment goals of Atrial fibrillation with RVR (HCC) [I48.91]  Acute on chronic systolic CHF (congestive heart failure) (HCC) [I50.23]  Dyspnea, unspecified type [R06.00]  Acute on chronic congestive heart failure, unspecified heart failure type (HCC) [I50.9]    Eden Vasquez RN  Case Management Department  Ph: [phone]

## 2024-02-06 ENCOUNTER — APPOINTMENT (OUTPATIENT)
Dept: CT IMAGING | Age: 69
End: 2024-02-06
Payer: MEDICARE

## 2024-02-06 LAB
ANION GAP SERPL CALCULATED.3IONS-SCNC: 15 MMOL/L (ref 3–16)
BASOPHILS # BLD: 0 K/UL (ref 0–0.2)
BASOPHILS NFR BLD: 0.4 %
BUN SERPL-MCNC: 23 MG/DL (ref 7–20)
CALCIUM SERPL-MCNC: 9.7 MG/DL (ref 8.3–10.6)
CHLORIDE SERPL-SCNC: 97 MMOL/L (ref 99–110)
CO2 SERPL-SCNC: 30 MMOL/L (ref 21–32)
CREAT SERPL-MCNC: 1.5 MG/DL (ref 0.8–1.3)
DEPRECATED RDW RBC AUTO: 15.7 % (ref 12.4–15.4)
EOSINOPHIL # BLD: 0.1 K/UL (ref 0–0.6)
EOSINOPHIL NFR BLD: 1.4 %
GFR SERPLBLD CREATININE-BSD FMLA CKD-EPI: 50 ML/MIN/{1.73_M2}
GLUCOSE SERPL-MCNC: 126 MG/DL (ref 70–99)
HCT VFR BLD AUTO: 48.2 % (ref 40.5–52.5)
HGB BLD-MCNC: 15.9 G/DL (ref 13.5–17.5)
LYMPHOCYTES # BLD: 0.9 K/UL (ref 1–5.1)
LYMPHOCYTES NFR BLD: 12.2 %
MCH RBC QN AUTO: 28.1 PG (ref 26–34)
MCHC RBC AUTO-ENTMCNC: 33 G/DL (ref 31–36)
MCV RBC AUTO: 85.4 FL (ref 80–100)
MONOCYTES # BLD: 0.7 K/UL (ref 0–1.3)
MONOCYTES NFR BLD: 9.5 %
NEUTROPHILS # BLD: 5.8 K/UL (ref 1.7–7.7)
NEUTROPHILS NFR BLD: 76.5 %
PLATELET # BLD AUTO: 141 K/UL (ref 135–450)
PMV BLD AUTO: 12.5 FL (ref 5–10.5)
POTASSIUM SERPL-SCNC: 3.8 MMOL/L (ref 3.5–5.1)
RBC # BLD AUTO: 5.65 M/UL (ref 4.2–5.9)
SLIDE REVIEW: ABNORMAL
SODIUM SERPL-SCNC: 142 MMOL/L (ref 136–145)
WBC # BLD AUTO: 7.6 K/UL (ref 4–11)

## 2024-02-06 PROCEDURE — 6370000000 HC RX 637 (ALT 250 FOR IP): Performed by: INTERNAL MEDICINE

## 2024-02-06 PROCEDURE — 97116 GAIT TRAINING THERAPY: CPT

## 2024-02-06 PROCEDURE — 2580000003 HC RX 258: Performed by: INTERNAL MEDICINE

## 2024-02-06 PROCEDURE — 2060000000 HC ICU INTERMEDIATE R&B

## 2024-02-06 PROCEDURE — 2700000000 HC OXYGEN THERAPY PER DAY

## 2024-02-06 PROCEDURE — 94669 MECHANICAL CHEST WALL OSCILL: CPT

## 2024-02-06 PROCEDURE — 97535 SELF CARE MNGMENT TRAINING: CPT

## 2024-02-06 PROCEDURE — 80048 BASIC METABOLIC PNL TOTAL CA: CPT

## 2024-02-06 PROCEDURE — 97530 THERAPEUTIC ACTIVITIES: CPT

## 2024-02-06 PROCEDURE — 99223 1ST HOSP IP/OBS HIGH 75: CPT | Performed by: INTERNAL MEDICINE

## 2024-02-06 PROCEDURE — 71250 CT THORAX DX C-: CPT

## 2024-02-06 PROCEDURE — 6360000002 HC RX W HCPCS: Performed by: INTERNAL MEDICINE

## 2024-02-06 PROCEDURE — 94761 N-INVAS EAR/PLS OXIMETRY MLT: CPT

## 2024-02-06 PROCEDURE — 6370000000 HC RX 637 (ALT 250 FOR IP): Performed by: NURSE PRACTITIONER

## 2024-02-06 PROCEDURE — 36415 COLL VENOUS BLD VENIPUNCTURE: CPT

## 2024-02-06 PROCEDURE — 85025 COMPLETE CBC W/AUTO DIFF WBC: CPT

## 2024-02-06 PROCEDURE — 99233 SBSQ HOSP IP/OBS HIGH 50: CPT | Performed by: INTERNAL MEDICINE

## 2024-02-06 PROCEDURE — 94640 AIRWAY INHALATION TREATMENT: CPT

## 2024-02-06 PROCEDURE — 94660 CPAP INITIATION&MGMT: CPT

## 2024-02-06 PROCEDURE — 6370000000 HC RX 637 (ALT 250 FOR IP): Performed by: STUDENT IN AN ORGANIZED HEALTH CARE EDUCATION/TRAINING PROGRAM

## 2024-02-06 RX ORDER — TORSEMIDE 20 MG/1
20 TABLET ORAL DAILY
Status: DISCONTINUED | OUTPATIENT
Start: 2024-02-06 | End: 2024-02-10 | Stop reason: HOSPADM

## 2024-02-06 RX ADMIN — FERROUS SULFATE TAB 325 MG (65 MG ELEMENTAL FE) 325 MG: 325 (65 FE) TAB at 17:18

## 2024-02-06 RX ADMIN — METOPROLOL SUCCINATE 50 MG: 50 TABLET, EXTENDED RELEASE ORAL at 09:46

## 2024-02-06 RX ADMIN — GABAPENTIN 200 MG: 100 CAPSULE ORAL at 21:46

## 2024-02-06 RX ADMIN — Medication 10 ML: at 09:47

## 2024-02-06 RX ADMIN — SPIRONOLACTONE 25 MG: 25 TABLET ORAL at 09:46

## 2024-02-06 RX ADMIN — MOMETASONE FUROATE AND FORMOTEROL FUMARATE DIHYDRATE 2 PUFF: 200; 5 AEROSOL RESPIRATORY (INHALATION) at 20:29

## 2024-02-06 RX ADMIN — MOMETASONE FUROATE AND FORMOTEROL FUMARATE DIHYDRATE 2 PUFF: 200; 5 AEROSOL RESPIRATORY (INHALATION) at 08:28

## 2024-02-06 RX ADMIN — POTASSIUM CHLORIDE 40 MEQ: 750 TABLET, FILM COATED, EXTENDED RELEASE ORAL at 21:46

## 2024-02-06 RX ADMIN — TORSEMIDE 20 MG: 20 TABLET ORAL at 14:02

## 2024-02-06 RX ADMIN — APIXABAN 5 MG: 5 TABLET, FILM COATED ORAL at 21:46

## 2024-02-06 RX ADMIN — GABAPENTIN 200 MG: 100 CAPSULE ORAL at 09:47

## 2024-02-06 RX ADMIN — CITALOPRAM HYDROBROMIDE 20 MG: 20 TABLET ORAL at 09:46

## 2024-02-06 RX ADMIN — GABAPENTIN 200 MG: 100 CAPSULE ORAL at 14:02

## 2024-02-06 RX ADMIN — ALPRAZOLAM 1 MG: 0.5 TABLET ORAL at 09:45

## 2024-02-06 RX ADMIN — FERROUS SULFATE TAB 325 MG (65 MG ELEMENTAL FE) 325 MG: 325 (65 FE) TAB at 09:47

## 2024-02-06 RX ADMIN — APIXABAN 5 MG: 5 TABLET, FILM COATED ORAL at 09:46

## 2024-02-06 RX ADMIN — POTASSIUM CHLORIDE 40 MEQ: 750 TABLET, FILM COATED, EXTENDED RELEASE ORAL at 09:47

## 2024-02-06 RX ADMIN — FUROSEMIDE 60 MG: 10 INJECTION, SOLUTION INTRAMUSCULAR; INTRAVENOUS at 09:32

## 2024-02-06 RX ADMIN — EMPAGLIFLOZIN 10 MG: 10 TABLET, FILM COATED ORAL at 09:46

## 2024-02-06 RX ADMIN — LISINOPRIL 20 MG: 20 TABLET ORAL at 14:01

## 2024-02-06 RX ADMIN — Medication 10 ML: at 21:47

## 2024-02-06 ASSESSMENT — PAIN SCALES - GENERAL: PAINLEVEL_OUTOF10: 0

## 2024-02-06 NOTE — CONSULTS
No premature CAD.     Review of System:  Pertinent positives and negatives are mentioned in the HPI.  The rest of the systems are negative.    Physical Examination:  Vitals:    24 1355   BP: (!) 137/112   Pulse: 81   Resp: 24   Temp:    SpO2: 92%        Intake/Output Summary (Last 24 hours) at 2024 1515  Last data filed at 2024 1155  Gross per 24 hour   Intake 780 ml   Output 1595 ml   Net -815 ml     In: 910 [P.O.:900; I.V.:10]  Out: 2365    Wt Readings from Last 3 Encounters:   24 117.9 kg (259 lb 14.8 oz)   20 123.4 kg (272 lb 0.8 oz)   10/27/16 123.4 kg (272 lb)     Temp  Av.6 °F (36.4 °C)  Min: 97.2 °F (36.2 °C)  Max: 97.9 °F (36.6 °C)  Pulse  Av  Min: 52  Max: 107  BP  Min: 110/69  Max: 142/87  SpO2  Av.7 %  Min: 92 %  Max: 100 %  FiO2   Av %  Min: 40 %  Max: 40 %    Telemetry: atrial fibrillation  Constitutional: Alert. Oriented to person, place, and time.   Eyes: Conjunctivae normal. EOM are normal.   Neck: Neck supple. Supple, no JVD  Cardiovascular: Irregularly irregular  Pulmonary/Chest: crackles at the left lower base.  Abdominal: distended  Musculoskeletal: Trace edema    Neurological: Alert and oriented..  Skin: Skin is warm and dry..  Psychiatric: No anxiety nor agitation.    Labs:  Reviewed.   Recent Labs     24  0624  0740 24  0917    139 142   K 3.2* 3.4* 3.8    97* 97*   CO2 30 32 30   BUN 13 18 23*   CREATININE 1.1 1.2 1.5*     Recent Labs     24  0624  0740 24  0917   WBC 7.3 6.3 7.6   HGB 14.9 14.5 15.9   HCT 44.6 43.3 48.2   MCV 85.1 84.6 85.4   * 131* 141     No results found for: \"CKTOTAL\", \"CKMB\", \"CKMBINDEX\", \"TROPONINI\"  No results found for: \"BNP\"  Lab Results   Component Value Date/Time    PROTIME 14.2 2024 06:59 PM    PROTIME 18.3 10/23/2014 11:31 AM    PROTIME 60.8 2014 12:00 AM    PROTIME 14.1 2014 12:18 PM    INR 1.10 2024 06:59 PM    INR 1.9  04/02/2015 02:08 PM    INR 1.5 02/27/2015 12:00 AM    INR 4.1 01/16/2015 12:29 PM     Lab Results   Component Value Date/Time    CHOL 125 10/27/2016 05:00 PM    HDL 30 10/27/2016 05:00 PM    HDL 35 10/03/2011 03:05 PM    TRIG 76 10/27/2016 05:00 PM       Diagnostic and imaging results reviewed.     ECG: Atrial fibrillation with V rate of 108 bpm.     Echo: 2/3/2024  Conclusions      Summary   Normal left ventricle size, mild concentric wall thickness, and systolic   function with an estimated ejection fraction of 15-20%.   Global hypokinesis   Moderate to severe eccentric mitral regurgitation is present.   The left atrium is severely dilated.   Normal right ventricular size and function.      Signature      ------------------------------------------------------------------   Electronically signed by Alpesh Pruitt MD (Interpreting   physician) on 02/05/2024 at 03:34 PM    I independently reviewed and interpreted the ECG, telemetry, serology, echocardiographic results.    Scheduled Meds:   torsemide  20 mg Oral Daily    gabapentin  200 mg Oral TID    empagliflozin  10 mg Oral Daily    metoprolol succinate  50 mg Oral Daily    spironolactone  25 mg Oral Daily    potassium chloride  40 mEq Oral BID    apixaban  5 mg Oral BID    lisinopril  20 mg Oral Daily    mometasone-formoterol  2 puff Inhalation BID RT    citalopram  20 mg Oral Daily    ferrous sulfate  325 mg Oral BID WC    sodium chloride flush  5-40 mL IntraVENous 2 times per day    nicotine  1 patch TransDERmal Daily     Continuous Infusions:   sodium chloride       PRN Meds:.ALPRAZolam, albuterol, ipratropium, albuterol sulfate HFA, sodium chloride flush, sodium chloride, potassium chloride **OR** potassium alternative oral replacement **OR** potassium chloride, magnesium sulfate, ondansetron **OR** ondansetron, polyethylene glycol, acetaminophen **OR** acetaminophen     Problem List:   Patient Active Problem List    Diagnosis Date Noted    Acute on chronic

## 2024-02-06 NOTE — PLAN OF CARE
Problem: Discharge Planning  Goal: Discharge to home or other facility with appropriate resources  2/5/2024 2316 by Toby Barber, RN  Outcome: Progressing  Flowsheets (Taken 2/5/2024 2018)  Discharge to home or other facility with appropriate resources: Identify barriers to discharge with patient and caregiver     Problem: ABCDS Injury Assessment  Goal: Absence of physical injury  2/5/2024 2316 by Toby Barber, RN  Outcome: Progressing     Problem: Safety - Adult  Goal: Free from fall injury  2/5/2024 2316 by Toby Barber RN  Outcome: Progressing     Problem: Respiratory - Adult  Goal: Achieves optimal ventilation and oxygenation  2/5/2024 2316 by Toby Barber RN  Outcome: Progressing  Flowsheets (Taken 2/5/2024 2018)  Achieves optimal ventilation and oxygenation: Assess for changes in respiratory status     Problem: Musculoskeletal - Adult  Goal: Return mobility to safest level of function  Outcome: Progressing  Flowsheets (Taken 2/5/2024 2018)  Return Mobility to Safest Level of Function: Assess patient stability and activity tolerance for standing, transferring and ambulating with or without assistive devices     Problem: Musculoskeletal - Adult  Goal: Maintain proper alignment of affected body part  Outcome: Progressing  Flowsheets (Taken 2/5/2024 2018)  Maintain proper alignment of affected body part: Support and protect limb and body alignment per provider's orders     Problem: Metabolic/Fluid and Electrolytes - Adult  Goal: Glucose maintained within prescribed range  Outcome: Progressing  Flowsheets (Taken 2/5/2024 2018)  Glucose maintained within prescribed range: Monitor blood glucose as ordered     Problem: Hematologic - Adult  Goal: Maintains hematologic stability  Outcome: Progressing  Flowsheets (Taken 2/5/2024 2018)  Maintains hematologic stability: Assess for signs and symptoms of bleeding or hemorrhage

## 2024-02-06 NOTE — PROGRESS NOTES
V2.0    [facility] [place] [facility] Progress Note      Name:  Kaleb Mcclellan /Age/Sex: 1955  (68 y.o. male)   MRN & CSN:  [id number] & [id number] Encounter Date/Time: 2024 8:57 AM EDT   Location:  [id number] PCP: Enio Davalos MD     Attending:Berry Hall MD       Hospital Day: 4      HPI :   Chief Complaint: SOB.    Kaleb Mcclellan is a 68 y.o. male who presents with MHx significant for A-fib, congestive heart failure with ejection fraction of 30%, diabetes mellitus type 2, previous alcohol use, hepatitis C, hypertension, tobacco use.  Patient presented to the emergency room as he has not been feeling well for the Last couple of weeks and stopped taking all his medication at home, he presented with fatigue weakness and some palpitation and chest discomfort with high dyspnea, was found to be hypoxic with pulse ox of 87% required placing him on 2 L nasal cannula, also blood pressure was elevated at 214/163 as patient has not been taking any of his medication for the last couple of weeks.  He was found to be in A-fib with RVR with heart rate up to 150 but then it will slow down and will come down to the 80s on his own     Subjective:   Reported improved symptoms this morning.   Was on BiPAP all last night   Output 2.7 liters in the last 24 hours.   Review of Systems:      Pertinent positives and negatives discussed in HPI    Objective:     Intake/Output Summary (Last 24 hours) at 2024 1451  Last data filed at 2024 1155  Gross per 24 hour   Intake 780 ml   Output 1595 ml   Net -815 ml        Vitals:   Vitals:    24 0756 24 0826 24 0830 24 1355   BP: (!) 142/87   (!) 137/112   Pulse: 52 (!) 107 94 81   Resp: 19 23  24   Temp: 97.2 °F (36.2 °C)      TempSrc: Oral      SpO2: 96% 100% 97% 92%   Weight:       Height:             Physical Exam:      Physical Exam Performed:    BP (!) 137/112   Pulse 81   Temp 97.2 °F (36.2 °C) (Oral)   Resp 24   Ht 1.727 m (5' 8\")   Wt  Small fat containing umbilical hernia.  Severe compression deformity of L2 is again noted.     1.  Findings compatible diarrheal process.  Mild colonic distension is noted with mild mesenteric swirling but no evidence for volvulus.  This may be due to a partial obstructing process due to an underlying internal hernia.  No significant small bowel distension. 2.  Additional chronic and benign findings, as described.       CBC:   Recent Labs     02/04/24  0629 02/05/24  0740 02/06/24  0917   WBC 7.3 6.3 7.6   HGB 14.9 14.5 15.9   * 131* 141       BMP:    Recent Labs     02/04/24  0629 02/05/24  0740 02/06/24  0917    139 142   K 3.2* 3.4* 3.8    97* 97*   CO2 30 32 30   BUN 13 18 23*   CREATININE 1.1 1.2 1.5*   GLUCOSE 109* 107* 126*       Hepatic:   Recent Labs     02/03/24  1859   AST 16   ALT 12   BILITOT 2.1*   ALKPHOS 134*       Lipids:   Lab Results   Component Value Date/Time    CHOL 125 10/27/2016 05:00 PM    HDL 30 10/27/2016 05:00 PM    HDL 35 10/03/2011 03:05 PM    TRIG 76 10/27/2016 05:00 PM     Hemoglobin A1C:   Lab Results   Component Value Date/Time    LABA1C 5.2 10/23/2014 12:00 AM     TSH:   Lab Results   Component Value Date/Time    TSH 0.27 10/27/2016 05:00 PM     Troponin: No results found for: \"TROPONINT\"  Lactic Acid:   Recent Labs     02/03/24  1859   LACTA 1.8       BNP:   Recent Labs     02/03/24  1859   PROBNP 5,655*       UA:  Lab Results   Component Value Date/Time    NITRU Negative 02/03/2024 07:51 PM    COLORU Yellow 02/03/2024 07:51 PM    PHUR 6.0 02/03/2024 07:51 PM    WBCUA 0-2 02/03/2024 07:51 PM    RBCUA 3-4 02/03/2024 07:51 PM    MUCUS 1+ 02/03/2024 07:51 PM    BACTERIA Rare 02/03/2024 07:51 PM    CLARITYU Clear 02/03/2024 07:51 PM    SPECGRAV 1.020 02/03/2024 07:51 PM    LEUKOCYTESUR Negative 02/03/2024 07:51 PM    UROBILINOGEN 1.0 02/03/2024 07:51 PM    BILIRUBINUR Negative 02/03/2024 07:51 PM    BLOODU TRACE-INTACT 02/03/2024 07:51 PM    GLUCOSEU Negative  Benzoyl Peroxide Counseling: Patient counseled that medicine may cause skin irritation and bleach clothing.  In the event of skin irritation, the patient was advised to reduce the amount of the drug applied or use it less frequently.   The patient verbalized understanding of the proper use and possible adverse effects of benzoyl peroxide.  All of the patient's questions and concerns were addressed.

## 2024-02-06 NOTE — PROGRESS NOTES
[facility]  Inpatient Progress Note    CC:         Shortness of breath, AF with RVR          HPI:   This is a 68 y.o. male came to the hospital with progressive shortness of breath over the last 7 to 10 days.  Along with this has been weight gain edema.  The patient's had stopped taking his medications.  In the emergency room he was hypertensive with AF rate of 150.  There was no chest pain complaint.    Patient has prior history of AF, systolic heart failure, HTN, DM and hepatitis C.       Interval history  Breathing has improved  Fluid balance -2535       Review of Systems -   Constitutional: Negative for weight gain/loss; malaise, fever  Respiratory: Negative for Asthma;  cough and hemoptysis  Cardiovascular: Negative for palpitations,dizziness   Gastrointestinal: Negative for abd.pain; constipation/diarrhea;    Genitourinary: Negative for stones; hematuria; frequency hesitancy  Integumentt: Negative for rash or pruritis  Hematologic/lymphatic: Negative for blood dyscrasia; leukemia/lymphoma  Musculoskeletal: Negative for Connective tissue disease  Neurological:  Negative for Seizure   Behavioral/Psych:Negative for Bipolar disorder, Schizophrenia; Dementia  Endocrine: negative for thyroid, parathyroid disease      Intake/Output Summary (Last 24 hours) at 2/6/2024 1008  Last data filed at 2/6/2024 0847  Gross per 24 hour   Intake 670 ml   Output 1300 ml   Net -630 ml         Physical Examination:    BP (!) 142/87   Pulse 94   Temp 97.2 °F (36.2 °C) (Oral)   Resp 21   Ht 1.727 m (5' 8\")   Wt 117.9 kg (259 lb 14.8 oz)   SpO2 97%   BMI 39.52 kg/m²   HEENT:  Face: Atraumatic, Conjunctiva: Pink; non icteric,  Mucous Memb:  Moist, No thyromegaly or Lymphadenopathy  Respiratory:  Resp Assessment: Normal, Resp Auscultation: clear   Cardiovascular:  Auscultation: nl S1 & S2, Palpation:  Nl PMI; No heaves or thrills, JVP:  normal  Abdomen: Soft, non-tender, Normal bowel sounds,  No  PRN **OR** ondansetron (ZOFRAN) injection 4 mg, 4 mg, IntraVENous, Q6H PRN  polyethylene glycol (GLYCOLAX) packet 17 g, 17 g, Oral, Daily PRN  acetaminophen (TYLENOL) tablet 650 mg, 650 mg, Oral, Q6H PRN **OR** acetaminophen (TYLENOL) suppository 650 mg, 650 mg, Rectal, Q6H PRN  nicotine (NICODERM CQ) 21 MG/24HR 1 patch, 1 patch, TransDERmal, Daily         Labs:   Recent Labs     02/04/24  0629 02/05/24  0740 02/06/24  0917   WBC 7.3 6.3 7.6   HGB 14.9 14.5 15.9   HCT 44.6 43.3 48.2   * 131*  --      Recent Labs     02/05/24  0740 02/06/24  0917    142   K 3.4* 3.8   CO2 32 30   BUN 18 23*   CREATININE 1.2 1.5*   LABGLOM >60 50*     No results for input(s): \"BNP\" in the last 72 hours.  Recent Labs     02/03/24  1859   PROTIME 14.2   INR 1.10     No results for input(s): \"CKTOTAL\", \"CKMB\", \"CKMBINDEX\", \"TROPONINI\" in the last 72 hours.      Telemetry Monitor:       EKG: A-fib with RVR        Chest X-Ray:  Worsening atelectasis or consolidation in the right lung base.  Cardiomegaly with persistent vascular congestion changes and bilateral lower lobe predominantly parenchymal opacities  Suspected trace bilateral pleural effusions    ECHO: 2/2020  Mild LVH; EF 25-30%; indeterminate diastolic function  Left atrium severely dilated  Moderately dilated RV with mild reduction in systolic function  Small pericardial effusion       Echo: 2/5/2024   Normal left ventricle size, mild concentric wall thickness, and systolic   function with an estimated ejection fraction of 15-20%.   Global hypokinesis   Moderate to severe eccentric mitral regurgitation is present.   The left atrium is severely dilated.   Normal right ventricular size and function.      ASSESSMENT AND PLAN:         Acute on chronic systolic heart failure: NYHA class IV on admission  Patient is noncompliant with his medicines and has stopped taking everything  Getting IV Lasix now  Repeat echo shows EF to be 15 to 20% with severe global hypokinesis and

## 2024-02-06 NOTE — PROGRESS NOTES
Physical Therapy  Facility/Department: [address] PROGRESSIVE CARE  Physical Therapy Treatment Note    Name: Kaleb Mcclellan  : 1955  MRN: [id number]  Date of Service: 2024    Discharge Recommendations:  Continue to assess pending progress (Anticipate adequate progress for d/c home.)   PT Equipment Recommendations  Other: Monitor for equipt needs.      Kaleb Mcclellan scored a 21/24 on the AM-PAC short mobility form. Current research shows that an AM-PAC score of 18 or greater is typically associated with a discharge to the patient's home setting. Based on the patient's AM-PAC score and their current functional mobility deficits, it is recommended that the patient have 2-3 sessions per week of Physical Therapy at d/c to increase the patient's independence.  At this time, this patient demonstrates the endurance and safety to discharge home with Home PT Evaluation  and a follow up treatment frequency of 2-3x/wk.  Please see assessment section for further patient specific details.    If patient discharges prior to next session this note will serve as a discharge summary.  Please see below for the latest assessment towards goals.       Assessment   Body Structures, Functions, Activity Limitations Requiring Skilled Therapeutic Intervention: Decreased functional mobility ;Decreased strength;Decreased endurance;Decreased balance  Assessment: 67 y/o male admit 2/3/2024 with CHF, A-Fib with RVR. PMH as noted including CHF, COPD, A-Fib, Spinal Stenosis, Hep C.  PTA pt living with friends in multi family home with 1 step to enter and flight of steps to access 2nd floor home; reports independent daily care and functional mobility (appears unkept/poor hygiene).    Pt currently requiring O2 5L (increase from 2L 2 days ago). Pt suzette oob, transfers/amb within hospital room setting with SBA/Slight CGA. Cues for O2 line awareness. Endurance limited.    At this time, anticipate adequate progress for d/c home.  Will cont  to monitor pt's progress.  Therapy Prognosis: Fair;Good  History: 67 y/o male admit 2/3/2024 with CHF, A-Fib with RVR. PMH as noted including CHF, COPD, A-Fib, Spinal Stenosis, Hep C.  Exam: See above.  Clinical Presentation: See above.  Barriers to Learning: Insight.  Requires PT Follow-Up: Yes  Activity Tolerance  Activity Tolerance: Patient limited by endurance  Activity Tolerance Comments: Pt currently requiring O2 5L (increase from 2L 2 days ago).  Pt suzette oob, transfers/amb within hospital room setting with SBA/Slight CGA.  Cues for O2 line awareness.  Endurance limited.  Sats remain at least 90% with amb short distance.     Plan   Physical Therapy Plan  General Plan: 3-5 times per week  Current Treatment Recommendations: Strengthening, Therapeutic activities, Functional mobility training, Transfer training, Gait training, Stair training, Safety education & training, Patient/Caregiver education & training  Safety Devices  Type of Devices: Call light within reach, Chair alarm in place, Gait belt, Left in chair, Nurse notified     Restrictions  Restrictions/Precautions  Restrictions/Precautions: Fall Risk  Position Activity Restriction  Other position/activity restrictions: O2 5L via NC (increase from 2L; 2 days ago).     Subjective   General  Chart Reviewed: Yes  Patient assessed for rehabilitation services?: Yes  Additional Pertinent Hx: 67 y/o male admit 2/3/2024 with CHF, A-Fib with RVR.  PMH as noted including CHF, COPD, A-Fib, Spinal Stenosis, Hep C.  Family / Caregiver Present: No  Referring Practitioner: Dr. Fletcher  Follows Commands: Within Functional Limits  Subjective  Subjective: Pt agreeable to PT Rx. Reports chronic low back pain.         Social/Functional History  Social/Functional History  Lives With: Friend(s)  Type of Home: House (2nd floor of multi family home.)  Home Layout: Multi-level (lives on 2nd fl)  Home Access: Stairs to enter without rails (1 step to enter; flight of steps to access 2nd

## 2024-02-06 NOTE — CONSULTS
REASON FOR CONSULTATION/CC: estella      Consult at request of Berry Hall MD for     PCP: Enio Davalos MD  Established Pulmonologist:  None    HISTORY OF PRESENT ILLNESS: Kaleb Mcclellan is a 68 y.o. year old male with a history of  who presents with     Patient has history of sleep apnea diagnosed 15 to 20 years ago who is not able to use his machine.  He also carries a diagnosis of COPD secondary to a long history of smoking treated by his primary care with inhalers.      Who presented with increased shortness of breath with lower extremity edema who has been on Lasix with a diuresis.  EF of 30%.       This note may have been  transcribed using Dragon Dictation software. Please disregard any translational errors.      Assessment:     Obesity  COPD  Sleep apnea  Tobacco abuse      Plan:      Hospital Day 3     Cardiovascular: EF 30%  Per cardiology     ESTELLA  He carries a diagnosis of sleep apnea with last sleep study approximately 15 years ago.  He was not able to tolerate CPAP at that time.  Able to use BiPAP here.  Therefore, to obtain device, he will need a repeat titration study.  I will schedule follow-up to do this.  Therefore, will be difficult to send him out with the device.    Acute hypoxemic respiratory failure saturations less than 90% on room air  Weaned to 90% saturation      Acute hypercapnic respiratory failure  No sternal pH 7.22, pCO2 71.  Initial bicarb 24.  No prior labs to be clear this is acute on chronic.  Given chest x-ray findings comparing to last CT, suspect this is secondary to current medical illness.  Change settings to AVAPS tidal volume 500,, EPAP 10 to treat sleep apnea with hypercapnia    COPD   No pft on file.  Smoker.   Pulm medications    Abnormal radiograph  Likely pulmonary edema.   -2.1 currently   Will get CT chest to better assess mass on chest X-ray , suspect 'pseudotumor' from pleural effusion          A fib  Per cardiology     HTN  Per attending / cardiology      APTT: No results for input(s): \"APTT\" in the last 72 hours.  UA:  Recent Labs     02/03/24  1951   COLORU Yellow   PHUR 6.0   WBCUA 0-2   RBCUA 3-4   MUCUS 1+*   BACTERIA Rare*   CLARITYU Clear   SPECGRAV 1.020   LEUKOCYTESUR Negative   UROBILINOGEN 1.0   BILIRUBINUR Negative   BLOODU TRACE-INTACT*   GLUCOSEU Negative     Recent Labs     02/05/24  0546   PHART 7.289*   YWX0UKN 71.4*   PO2ART 102.0            Radiology Review:  Pertinent images / reports were reviewed as a part of this visit.    CT Chest w/ contrast: Results for orders placed during the hospital encounter of 02/06/20    CT CHEST W CONTRAST    Narrative  EXAMINATION:  CT OF THE CHEST WITH CONTRAST 2/6/2020 11:02 am    TECHNIQUE:  CT of the chest was performed with the administration of intravenous  contrast. Multiplanar reformatted images are provided for review. Dose  modulation, iterative reconstruction, and/or weight based adjustment of the  mA/kV was utilized to reduce the radiation dose to as low as reasonably  achievable.    COMPARISON:  06/05/2017    HISTORY:  ORDERING SYSTEM PROVIDED HISTORY: [place] lesion  TECHNOLOGIST PROVIDED HISTORY:  Reason for Exam:   coin lesion,  pleural effusion,  Acuity: Chronic  Type of Exam: Subsequent/Follow-up    FINDINGS:  Mediastinum: Calcified and soft plaque is seen within the thoracic aorta.  Coronary artery calcification.  Moderate pericardial effusion is seen.  Right  thyroid goiter is seen extending to the superior mediastinum.  Right lobe of  thyroid is heterogeneous with hypodense nodules measuring up to approximately  1 cm.  No axillary adenopathy.    Lungs/pleura: 4 mm noncalcified nodule within the right lower lobe on series  2, image 77 is not significantly changed.  Calcified left lower lobe  pulmonary nodule, compatible with granuloma.  Smaller punctate adjacent  satellite nodule is also unchanged.  Scattered linear and ground-glass  opacity bilaterally, likely atelectasis.  No pneumothorax or

## 2024-02-06 NOTE — PROGRESS NOTES
Slept well, easily aroused for routine checks. Wore bipap from HS to end of night shift, tolerated well. No c/o excessive discomfort. In bed, call light in reach.

## 2024-02-06 NOTE — PROGRESS NOTES
Occupational Therapy  Facility/Department: [address] PROGRESSIVE CARE  Occupational Therapy Daily Note  Name: Kaleb Mcclellan  : 1955  MRN: [id number]  Date of Service: 2024    Discharge Recommendations:  Home with Home health OT, Home with nursing aide, S Level 1, Home with assist PRN  OT Equipment Recommendations  Other: TBD--may need O2, reacher, wide SA; shower chair for energy conservation   Kaleb Mcclellan scored a 21/24 on the AM-PAC ADL Inpatient form. Current research shows that an AM-PAC score of 18 or greater is typically associated with a discharge to the patient's home setting. Based on the patient's AM-PAC score, and their current ADL deficits, it is recommended that the patient have 2-3 sessions per week of Occupational Therapy at d/c to increase the patient's independence.  At this time, this patient demonstrates the endurance and safety to discharge home with HHOT and a follow up treatment frequency of 2-3x/wk.   Please see assessment section for further patient specific details.    If patient discharges prior to next session this note will serve as a discharge summary.  Please see below for the latest assessment towards goals.      Patient Diagnosis(es): The primary encounter diagnosis was Acute on chronic congestive heart failure, unspecified heart failure type (HCC). Diagnoses of Dyspnea, unspecified type and Atrial fibrillation with RVR (HCC) were also pertinent to this visit.  Past Medical History:  has a past medical history of A-fib (HCC), Arthritis, Bone spur, DDD (degenerative disc disease), DM (diabetes mellitus) (HCC), ETOH abuse, Hepatitis C, HTN (hypertension), Internal hemorrhoids, OA (osteoarthritis), Sleep apnea, Smoker, and Spinal stenosis.  Past Surgical History:  has no past surgical history on file.    Treatment Diagnosis: impaired endurance, ADLs, balance      Assessment   Performance deficits / Impairments: Decreased functional mobility ;Decreased

## 2024-02-07 LAB
ANION GAP SERPL CALCULATED.3IONS-SCNC: 10 MMOL/L (ref 3–16)
BASOPHILS # BLD: 0.1 K/UL (ref 0–0.2)
BASOPHILS NFR BLD: 0.9 %
BUN SERPL-MCNC: 27 MG/DL (ref 7–20)
CALCIUM SERPL-MCNC: 9.1 MG/DL (ref 8.3–10.6)
CHLORIDE SERPL-SCNC: 97 MMOL/L (ref 99–110)
CO2 SERPL-SCNC: 32 MMOL/L (ref 21–32)
CREAT SERPL-MCNC: 1.5 MG/DL (ref 0.8–1.3)
DEPRECATED RDW RBC AUTO: 15.4 % (ref 12.4–15.4)
EOSINOPHIL # BLD: 0.1 K/UL (ref 0–0.6)
EOSINOPHIL NFR BLD: 1.3 %
GFR SERPLBLD CREATININE-BSD FMLA CKD-EPI: 50 ML/MIN/{1.73_M2}
GLUCOSE SERPL-MCNC: 121 MG/DL (ref 70–99)
HCT VFR BLD AUTO: 43.9 % (ref 40.5–52.5)
HGB BLD-MCNC: 14.8 G/DL (ref 13.5–17.5)
LYMPHOCYTES # BLD: 0.9 K/UL (ref 1–5.1)
LYMPHOCYTES NFR BLD: 11.5 %
MCH RBC QN AUTO: 28.4 PG (ref 26–34)
MCHC RBC AUTO-ENTMCNC: 33.7 G/DL (ref 31–36)
MCV RBC AUTO: 84.1 FL (ref 80–100)
MONOCYTES # BLD: 0.9 K/UL (ref 0–1.3)
MONOCYTES NFR BLD: 12.2 %
NEUTROPHILS # BLD: 5.6 K/UL (ref 1.7–7.7)
NEUTROPHILS NFR BLD: 74.1 %
PLATELET # BLD AUTO: 128 K/UL (ref 135–450)
PLATELET BLD QL SMEAR: ABNORMAL
PMV BLD AUTO: 12.9 FL (ref 5–10.5)
POTASSIUM SERPL-SCNC: 4 MMOL/L (ref 3.5–5.1)
RBC # BLD AUTO: 5.22 M/UL (ref 4.2–5.9)
RBC MORPH BLD: NORMAL
SLIDE REVIEW: ABNORMAL
SODIUM SERPL-SCNC: 139 MMOL/L (ref 136–145)
WBC # BLD AUTO: 7.6 K/UL (ref 4–11)

## 2024-02-07 PROCEDURE — 6370000000 HC RX 637 (ALT 250 FOR IP): Performed by: NURSE PRACTITIONER

## 2024-02-07 PROCEDURE — 6370000000 HC RX 637 (ALT 250 FOR IP): Performed by: INTERNAL MEDICINE

## 2024-02-07 PROCEDURE — 94761 N-INVAS EAR/PLS OXIMETRY MLT: CPT

## 2024-02-07 PROCEDURE — 99233 SBSQ HOSP IP/OBS HIGH 50: CPT | Performed by: INTERNAL MEDICINE

## 2024-02-07 PROCEDURE — 2700000000 HC OXYGEN THERAPY PER DAY

## 2024-02-07 PROCEDURE — 2060000000 HC ICU INTERMEDIATE R&B

## 2024-02-07 PROCEDURE — 6370000000 HC RX 637 (ALT 250 FOR IP): Performed by: STUDENT IN AN ORGANIZED HEALTH CARE EDUCATION/TRAINING PROGRAM

## 2024-02-07 PROCEDURE — 94669 MECHANICAL CHEST WALL OSCILL: CPT

## 2024-02-07 PROCEDURE — 80048 BASIC METABOLIC PNL TOTAL CA: CPT

## 2024-02-07 PROCEDURE — 94640 AIRWAY INHALATION TREATMENT: CPT

## 2024-02-07 PROCEDURE — 94660 CPAP INITIATION&MGMT: CPT

## 2024-02-07 PROCEDURE — 85025 COMPLETE CBC W/AUTO DIFF WBC: CPT

## 2024-02-07 PROCEDURE — 2580000003 HC RX 258: Performed by: INTERNAL MEDICINE

## 2024-02-07 PROCEDURE — 36415 COLL VENOUS BLD VENIPUNCTURE: CPT

## 2024-02-07 PROCEDURE — 97535 SELF CARE MNGMENT TRAINING: CPT

## 2024-02-07 PROCEDURE — 97530 THERAPEUTIC ACTIVITIES: CPT

## 2024-02-07 RX ADMIN — METOPROLOL SUCCINATE 50 MG: 50 TABLET, EXTENDED RELEASE ORAL at 09:34

## 2024-02-07 RX ADMIN — GABAPENTIN 200 MG: 100 CAPSULE ORAL at 09:34

## 2024-02-07 RX ADMIN — ALPRAZOLAM 1 MG: 0.5 TABLET ORAL at 09:38

## 2024-02-07 RX ADMIN — SPIRONOLACTONE 25 MG: 25 TABLET ORAL at 09:34

## 2024-02-07 RX ADMIN — ALPRAZOLAM 1 MG: 0.5 TABLET ORAL at 20:25

## 2024-02-07 RX ADMIN — CITALOPRAM HYDROBROMIDE 20 MG: 20 TABLET ORAL at 09:34

## 2024-02-07 RX ADMIN — APIXABAN 5 MG: 5 TABLET, FILM COATED ORAL at 09:34

## 2024-02-07 RX ADMIN — GABAPENTIN 200 MG: 100 CAPSULE ORAL at 12:57

## 2024-02-07 RX ADMIN — FERROUS SULFATE TAB 325 MG (65 MG ELEMENTAL FE) 325 MG: 325 (65 FE) TAB at 12:57

## 2024-02-07 RX ADMIN — FERROUS SULFATE TAB 325 MG (65 MG ELEMENTAL FE) 325 MG: 325 (65 FE) TAB at 09:34

## 2024-02-07 RX ADMIN — MOMETASONE FUROATE AND FORMOTEROL FUMARATE DIHYDRATE 2 PUFF: 200; 5 AEROSOL RESPIRATORY (INHALATION) at 08:18

## 2024-02-07 RX ADMIN — POTASSIUM CHLORIDE 40 MEQ: 750 TABLET, FILM COATED, EXTENDED RELEASE ORAL at 09:38

## 2024-02-07 RX ADMIN — FERROUS SULFATE TAB 325 MG (65 MG ELEMENTAL FE) 325 MG: 325 (65 FE) TAB at 20:25

## 2024-02-07 RX ADMIN — TORSEMIDE 20 MG: 20 TABLET ORAL at 09:38

## 2024-02-07 RX ADMIN — POTASSIUM CHLORIDE 40 MEQ: 750 TABLET, FILM COATED, EXTENDED RELEASE ORAL at 20:25

## 2024-02-07 RX ADMIN — EMPAGLIFLOZIN 10 MG: 10 TABLET, FILM COATED ORAL at 09:34

## 2024-02-07 RX ADMIN — GABAPENTIN 200 MG: 100 CAPSULE ORAL at 20:25

## 2024-02-07 RX ADMIN — MOMETASONE FUROATE AND FORMOTEROL FUMARATE DIHYDRATE 2 PUFF: 200; 5 AEROSOL RESPIRATORY (INHALATION) at 20:59

## 2024-02-07 RX ADMIN — LISINOPRIL 20 MG: 20 TABLET ORAL at 12:57

## 2024-02-07 RX ADMIN — Medication 10 ML: at 20:25

## 2024-02-07 RX ADMIN — Medication 10 ML: at 09:36

## 2024-02-07 RX ADMIN — APIXABAN 5 MG: 5 TABLET, FILM COATED ORAL at 20:25

## 2024-02-07 ASSESSMENT — PAIN SCALES - GENERAL: PAINLEVEL_OUTOF10: 0

## 2024-02-07 NOTE — PLAN OF CARE
Problem: Discharge Planning  Goal: Discharge to home or other facility with appropriate resources  Outcome: Progressing  Flowsheets (Taken 2/6/2024 2200)  Discharge to home or other facility with appropriate resources: Identify barriers to discharge with patient and caregiver     Problem: ABCDS Injury Assessment  Goal: Absence of physical injury  Outcome: Progressing     Problem: Safety - Adult  Goal: Free from fall injury  Outcome: Progressing     Problem: Respiratory - Adult  Goal: Achieves optimal ventilation and oxygenation  Outcome: Progressing  Flowsheets (Taken 2/6/2024 2200)  Achieves optimal ventilation and oxygenation: Assess for changes in respiratory status     Problem: Cardiovascular - Adult  Goal: Maintains optimal cardiac output and hemodynamic stability  Outcome: Progressing  Flowsheets (Taken 2/6/2024 2200)  Maintains optimal cardiac output and hemodynamic stability: Monitor blood pressure and heart rate     Problem: Metabolic/Fluid and Electrolytes - Adult  Goal: Electrolytes maintained within normal limits  Outcome: Progressing  Flowsheets (Taken 2/6/2024 2200)  Electrolytes maintained within normal limits: Monitor labs and assess patient for signs and symptoms of electrolyte imbalances

## 2024-02-07 NOTE — PROGRESS NOTES
Occupational Therapy  Facility/Department: [address] PROGRESSIVE CARE  Occupational Daily Treatment Note      Name: Kaleb Mcclellan  : 1955  MRN: [id number]  Date of Service: 2024    Discharge Recommendations:  Home with Home health OT, Home with nursing aide, S Level 1, Home with assist PRN          Patient Diagnosis(es): The primary encounter diagnosis was Acute on chronic congestive heart failure, unspecified heart failure type (HCC). Diagnoses of Dyspnea, unspecified type and Atrial fibrillation with RVR (HCC) were also pertinent to this visit.  Past Medical History:  has a past medical history of A-fib (HCC), Arthritis, Bone spur, DDD (degenerative disc disease), DM (diabetes mellitus) (HCC), ETOH abuse, Hepatitis C, HTN (hypertension), Internal hemorrhoids, OA (osteoarthritis), Sleep apnea, Smoker, and Spinal stenosis.  Past Surgical History:  has no past surgical history on file.    Treatment Diagnosis: impaired endurance, ADLs, balance    Kaleb Mcclellan scored a 21/24 on the AM-PAC ADL Inpatient form. Current research shows that an AM-PAC score of 18 or greater is typically associated with a discharge to the patient's home setting. Based on the patient's AM-PAC score, and their current ADL deficits, it is recommended that the patient have 2-3 sessions per week of Occupational Therapy at d/c to increase the patient's independence.  At this time, this patient demonstrates the endurance and safety to discharge home with home (home vs OP services) and a follow up treatment frequency of 2-3x/wk.   Please see assessment section for further patient specific details.    If patient discharges prior to next session this note will serve as a discharge summary.  Please see below for the latest assessment towards goals.     HOME HEALTH CARE: LEVEL 1 STANDARD     -Initial home health evaluation to occur within 24-48 hours, in patient home    -Home health agency to establish plan of care for patient over 60  services       Therapy Time   Individual Concurrent Group Co-treatment   Time In 1250         Time Out 1319         Minutes 29                 Electronically signed by Diana Escalante, [id number] on 2/7/2024 at 1:20 PM    OTR was consulted with this patients treatment/intervention plan.

## 2024-02-07 NOTE — PROGRESS NOTES
Pulmonary Progress Note    Date of Admission: 2/3/2024   LOS: 4 days       CC:  Chief Complaint   Patient presents with    Shortness of Breath     Increased shortness of breath x1 week. Patient reports cough with yellow sputum which resolved around taht time. Reports some chest soreness with cough. Also reports body spinning sensation x4 days which is worse with movement. Hx of COPD. Denies hx of stroke or cardiac problems.        Subjective:  Sleeping    ROS:        Assessment:     Obesity  COPD  Sleep apnea  Tobacco abuse    Plan:     This note may have been transcribed using Dragon Dictation software. Please disregard any translational errors.       Hospital Day: 4     Cardiovascular: EF 30%  Per cardiology      ESTELLA  Will be further worked up as an outpatient     Acute hypoxemic respiratory failure saturations less than 90% on room air  Weaned to 90% saturation        Acute hypercapnic respiratory failure   AVAPS tidal volume 500,, EPAP 10 to treat sleep apnea with hypercapnia  Should improve with noninvasive ventilator and decreased pleural effusions     COPD   No pft on file.  Smoker.   Pulm medications     Abnormal radiograph  CT chest consistent with pleural effusions.  As expected, pseudotumor noted on CT chest.  Consistent with CHF.  Recommend diuresis.  Fluid removal has been somewhat difficult with acute kidney injury                Data:        PHYSICAL EXAM:   Blood pressure 128/68, pulse 97, temperature 98 °F (36.7 °C), temperature source Oral, resp. rate 18, height 1.727 m (5' 8\"), weight 119.9 kg (264 lb 5.3 oz), SpO2 97 %.'  Body mass index is 40.19 kg/m².   Gen: No distress.    ENT:   Resp: No accessory muscle use. No crackles. No wheezes. No rhonchi.    CV: Regular rate. Regular rhythm. No murmur or rub. No edema.   Skin: Warm, dry, normal texture and turgor. No nodule on exposed extremities.   M/S: No cyanosis. No clubbing. No joint deformity.  Psych: Oriented x 3. No anxiety.  Awake. Alert.  \"PO2\" in the last 72 hours.    Cx:      Films:                This note was transcribed using Dragon Dictation software. Please disregard any translational errors.      BALTA [facility]  [phone]

## 2024-02-07 NOTE — PROGRESS NOTES
[facility]  Inpatient Progress Note    CC:         Shortness of breath, AF with RVR          HPI:   This is a 68 y.o. male came to the hospital with progressive shortness of breath over the last 7 to 10 days.  Along with this has been weight gain edema.  The patient's had stopped taking his medications.  In the emergency room he was hypertensive with AF rate of 150.  There was no chest pain complaint.    Patient has prior history of AF, systolic heart failure, HTN, DM and hepatitis C.       Interval history  Breathing has improved  Fluid balance -2535       Review of Systems -   Constitutional: Negative for weight gain/loss; malaise, fever  Respiratory: Negative for Asthma;  cough and hemoptysis  Cardiovascular: Negative for palpitations,dizziness   Gastrointestinal: Negative for abd.pain; constipation/diarrhea;    Genitourinary: Negative for stones; hematuria; frequency hesitancy  Integumentt: Negative for rash or pruritis  Hematologic/lymphatic: Negative for blood dyscrasia; leukemia/lymphoma  Musculoskeletal: Negative for Connective tissue disease  Neurological:  Negative for Seizure   Behavioral/Psych:Negative for Bipolar disorder, Schizophrenia; Dementia  Endocrine: negative for thyroid, parathyroid disease      Intake/Output Summary (Last 24 hours) at 2/7/2024 1335  Last data filed at 2/7/2024 0943  Gross per 24 hour   Intake 840 ml   Output 200 ml   Net 640 ml         Physical Examination:    /68   Pulse 97   Temp 98 °F (36.7 °C) (Oral)   Resp 18   Ht 1.727 m (5' 8\")   Wt 119.9 kg (264 lb 5.3 oz)   SpO2 97%   BMI 40.19 kg/m²   HEENT:  Face: Atraumatic, Conjunctiva: Pink; non icteric,  Mucous Memb:  Moist, No thyromegaly or Lymphadenopathy  Respiratory:  Resp Assessment: Normal, Resp Auscultation: clear   Cardiovascular:  Auscultation: nl S1 & S2, Palpation:  Nl PMI; No heaves or thrills, JVP:  normal  Abdomen: Soft, non-tender, Normal bowel sounds,  No organomegaly  Extremities: No  injection 4 mg, 4 mg, IntraVENous, Q6H PRN  polyethylene glycol (GLYCOLAX) packet 17 g, 17 g, Oral, Daily PRN  acetaminophen (TYLENOL) tablet 650 mg, 650 mg, Oral, Q6H PRN **OR** acetaminophen (TYLENOL) suppository 650 mg, 650 mg, Rectal, Q6H PRN  nicotine (NICODERM CQ) 21 MG/24HR 1 patch, 1 patch, TransDERmal, Daily         Labs:   Recent Labs     02/06/24  0917 02/07/24  0454   WBC 7.6 7.6   HGB 15.9 14.8   HCT 48.2 43.9    128*     Recent Labs     02/06/24  0917 02/07/24  0454    139   K 3.8 4.0   CO2 30 32   BUN 23* 27*   CREATININE 1.5* 1.5*   LABGLOM 50* 50*     No results for input(s): \"BNP\" in the last 72 hours.  No results for input(s): \"PROTIME\", \"INR\" in the last 72 hours.    No results for input(s): \"CKTOTAL\", \"CKMB\", \"CKMBINDEX\", \"TROPONINI\" in the last 72 hours.      Telemetry Monitor:       EKG: A-fib with RVR        Chest X-Ray:  Worsening atelectasis or consolidation in the right lung base.  Cardiomegaly with persistent vascular congestion changes and bilateral lower lobe predominantly parenchymal opacities  Suspected trace bilateral pleural effusions    ECHO: 2/2020  Mild LVH; EF 25-30%; indeterminate diastolic function  Left atrium severely dilated  Moderately dilated RV with mild reduction in systolic function  Small pericardial effusion       Echo: 2/5/2024   Normal left ventricle size, mild concentric wall thickness, and systolic   function with an estimated ejection fraction of 15-20%.   Global hypokinesis   Moderate to severe eccentric mitral regurgitation is present.   The left atrium is severely dilated.   Normal right ventricular size and function.      ASSESSMENT AND PLAN:      Acute on chronic systolic heart failure: NYHA class IV on admission  Patient is noncompliant with his medicines and has stopped taking everything  Repeat echo shows EF to be 15 to 20% with severe global hypokinesis and moderate to severe MR  Has diuresed well with IV Lasix.  Weight down breathing

## 2024-02-07 NOTE — PLAN OF CARE
Problem: Discharge Planning  Goal: Discharge to home or other facility with appropriate resources  2/7/2024 1151 by Tiffany Núñez RN  Outcome: Progressing  2/6/2024 2326 by Toby Barber RN  Outcome: Progressing  Flowsheets (Taken 2/6/2024 2200)  Discharge to home or other facility with appropriate resources: Identify barriers to discharge with patient and caregiver     Problem: ABCDS Injury Assessment  Goal: Absence of physical injury  2/7/2024 1151 by Tiffany Núñez RN  Outcome: Progressing  2/6/2024 2326 by Toby Barber RN  Outcome: Progressing     Problem: Safety - Adult  Goal: Free from fall injury  2/7/2024 1151 by Tiffany Núñez RN  Outcome: Progressing  2/6/2024 2326 by Toby Barber RN  Outcome: Progressing     Problem: Respiratory - Adult  Goal: Achieves optimal ventilation and oxygenation  2/7/2024 1151 by Tiffany Núñez RN  Outcome: Progressing  2/6/2024 2326 by Toby Barber RN  Outcome: Progressing  Flowsheets (Taken 2/6/2024 2200)  Achieves optimal ventilation and oxygenation: Assess for changes in respiratory status     Problem: Cardiovascular - Adult  Goal: Maintains optimal cardiac output and hemodynamic stability  2/7/2024 1151 by Tiffany Núñez RN  Outcome: Progressing  2/6/2024 2326 by Toby Barber RN  Outcome: Progressing  Flowsheets (Taken 2/6/2024 2200)  Maintains optimal cardiac output and hemodynamic stability: Monitor blood pressure and heart rate  Goal: Absence of cardiac dysrhythmias or at baseline  2/7/2024 1151 by Tiffany Núñez RN  Outcome: Progressing  2/6/2024 2326 by Toby Barber RN  Outcome: Progressing  Flowsheets (Taken 2/6/2024 2200)  Absence of cardiac dysrhythmias or at baseline: Monitor cardiac rate and rhythm

## 2024-02-07 NOTE — CARE COORDINATION
Patient using BIPAP at night, currently on 6L via nasal cannula.   Pulmonology following. Watch for home oxygen needs.    Patient plans on returning home to his girlfriend's house.   Will continue to follow for discharge needs.    Electronically signed by Eden Vasquez RN Case Management on 2/7/2024 at 11:10 AM

## 2024-02-07 NOTE — PROGRESS NOTES
Slept well, easily aroused for routine checks. Wore bipap for about 3 hours tonight before wanting nasal canula replaced. No c/o discomfort. In bed, call light in reach.

## 2024-02-08 LAB
ANION GAP SERPL CALCULATED.3IONS-SCNC: 8 MMOL/L (ref 3–16)
BACTERIA BLD CULT ORG #2: NORMAL
BACTERIA BLD CULT: NORMAL
BASOPHILS # BLD: 0.1 K/UL (ref 0–0.2)
BASOPHILS NFR BLD: 1 %
BUN SERPL-MCNC: 30 MG/DL (ref 7–20)
CALCIUM SERPL-MCNC: 9.4 MG/DL (ref 8.3–10.6)
CHLORIDE SERPL-SCNC: 98 MMOL/L (ref 99–110)
CO2 SERPL-SCNC: 32 MMOL/L (ref 21–32)
CREAT SERPL-MCNC: 1.4 MG/DL (ref 0.8–1.3)
DEPRECATED RDW RBC AUTO: 15.5 % (ref 12.4–15.4)
EOSINOPHIL # BLD: 0.1 K/UL (ref 0–0.6)
EOSINOPHIL NFR BLD: 1.8 %
GFR SERPLBLD CREATININE-BSD FMLA CKD-EPI: 54 ML/MIN/{1.73_M2}
GLUCOSE SERPL-MCNC: 141 MG/DL (ref 70–99)
HCT VFR BLD AUTO: 43.7 % (ref 40.5–52.5)
HGB BLD-MCNC: 14.5 G/DL (ref 13.5–17.5)
LYMPHOCYTES # BLD: 1 K/UL (ref 1–5.1)
LYMPHOCYTES NFR BLD: 12 %
MCH RBC QN AUTO: 28.1 PG (ref 26–34)
MCHC RBC AUTO-ENTMCNC: 33.2 G/DL (ref 31–36)
MCV RBC AUTO: 84.6 FL (ref 80–100)
MONOCYTES # BLD: 0.9 K/UL (ref 0–1.3)
MONOCYTES NFR BLD: 11.7 %
NEUTROPHILS # BLD: 5.9 K/UL (ref 1.7–7.7)
NEUTROPHILS NFR BLD: 73.5 %
PLATELET # BLD AUTO: 132 K/UL (ref 135–450)
PLATELET BLD QL SMEAR: ABNORMAL
PMV BLD AUTO: 12.5 FL (ref 5–10.5)
POTASSIUM SERPL-SCNC: 4.8 MMOL/L (ref 3.5–5.1)
RBC # BLD AUTO: 5.16 M/UL (ref 4.2–5.9)
SLIDE REVIEW: ABNORMAL
SODIUM SERPL-SCNC: 138 MMOL/L (ref 136–145)
WBC # BLD AUTO: 8 K/UL (ref 4–11)

## 2024-02-08 PROCEDURE — 94640 AIRWAY INHALATION TREATMENT: CPT

## 2024-02-08 PROCEDURE — 2580000003 HC RX 258: Performed by: INTERNAL MEDICINE

## 2024-02-08 PROCEDURE — 6370000000 HC RX 637 (ALT 250 FOR IP): Performed by: INTERNAL MEDICINE

## 2024-02-08 PROCEDURE — 36415 COLL VENOUS BLD VENIPUNCTURE: CPT

## 2024-02-08 PROCEDURE — 6370000000 HC RX 637 (ALT 250 FOR IP): Performed by: STUDENT IN AN ORGANIZED HEALTH CARE EDUCATION/TRAINING PROGRAM

## 2024-02-08 PROCEDURE — 94761 N-INVAS EAR/PLS OXIMETRY MLT: CPT

## 2024-02-08 PROCEDURE — 94669 MECHANICAL CHEST WALL OSCILL: CPT

## 2024-02-08 PROCEDURE — 97535 SELF CARE MNGMENT TRAINING: CPT

## 2024-02-08 PROCEDURE — 97530 THERAPEUTIC ACTIVITIES: CPT

## 2024-02-08 PROCEDURE — 2700000000 HC OXYGEN THERAPY PER DAY

## 2024-02-08 PROCEDURE — 85025 COMPLETE CBC W/AUTO DIFF WBC: CPT

## 2024-02-08 PROCEDURE — 2060000000 HC ICU INTERMEDIATE R&B

## 2024-02-08 PROCEDURE — 6370000000 HC RX 637 (ALT 250 FOR IP): Performed by: NURSE PRACTITIONER

## 2024-02-08 PROCEDURE — 80048 BASIC METABOLIC PNL TOTAL CA: CPT

## 2024-02-08 PROCEDURE — 99232 SBSQ HOSP IP/OBS MODERATE 35: CPT | Performed by: INTERNAL MEDICINE

## 2024-02-08 PROCEDURE — 97110 THERAPEUTIC EXERCISES: CPT

## 2024-02-08 PROCEDURE — 99233 SBSQ HOSP IP/OBS HIGH 50: CPT | Performed by: INTERNAL MEDICINE

## 2024-02-08 PROCEDURE — 94660 CPAP INITIATION&MGMT: CPT

## 2024-02-08 PROCEDURE — 97116 GAIT TRAINING THERAPY: CPT

## 2024-02-08 RX ADMIN — CITALOPRAM HYDROBROMIDE 20 MG: 20 TABLET ORAL at 08:37

## 2024-02-08 RX ADMIN — METOPROLOL SUCCINATE 50 MG: 50 TABLET, EXTENDED RELEASE ORAL at 08:37

## 2024-02-08 RX ADMIN — ACETAMINOPHEN 650 MG: 325 TABLET ORAL at 23:02

## 2024-02-08 RX ADMIN — ALPRAZOLAM 1 MG: 0.5 TABLET ORAL at 20:32

## 2024-02-08 RX ADMIN — APIXABAN 5 MG: 5 TABLET, FILM COATED ORAL at 20:28

## 2024-02-08 RX ADMIN — GABAPENTIN 200 MG: 100 CAPSULE ORAL at 13:43

## 2024-02-08 RX ADMIN — MOMETASONE FUROATE AND FORMOTEROL FUMARATE DIHYDRATE 2 PUFF: 200; 5 AEROSOL RESPIRATORY (INHALATION) at 19:39

## 2024-02-08 RX ADMIN — TORSEMIDE 20 MG: 20 TABLET ORAL at 08:36

## 2024-02-08 RX ADMIN — APIXABAN 5 MG: 5 TABLET, FILM COATED ORAL at 08:37

## 2024-02-08 RX ADMIN — GABAPENTIN 200 MG: 100 CAPSULE ORAL at 20:28

## 2024-02-08 RX ADMIN — POTASSIUM CHLORIDE 40 MEQ: 750 TABLET, FILM COATED, EXTENDED RELEASE ORAL at 08:37

## 2024-02-08 RX ADMIN — FERROUS SULFATE TAB 325 MG (65 MG ELEMENTAL FE) 325 MG: 325 (65 FE) TAB at 08:37

## 2024-02-08 RX ADMIN — FERROUS SULFATE TAB 325 MG (65 MG ELEMENTAL FE) 325 MG: 325 (65 FE) TAB at 18:30

## 2024-02-08 RX ADMIN — POTASSIUM CHLORIDE 40 MEQ: 750 TABLET, FILM COATED, EXTENDED RELEASE ORAL at 20:28

## 2024-02-08 RX ADMIN — SPIRONOLACTONE 25 MG: 25 TABLET ORAL at 08:37

## 2024-02-08 RX ADMIN — LISINOPRIL 20 MG: 20 TABLET ORAL at 13:43

## 2024-02-08 RX ADMIN — ALPRAZOLAM 1 MG: 0.5 TABLET ORAL at 13:49

## 2024-02-08 RX ADMIN — GABAPENTIN 200 MG: 100 CAPSULE ORAL at 08:37

## 2024-02-08 RX ADMIN — MOMETASONE FUROATE AND FORMOTEROL FUMARATE DIHYDRATE 2 PUFF: 200; 5 AEROSOL RESPIRATORY (INHALATION) at 09:07

## 2024-02-08 RX ADMIN — EMPAGLIFLOZIN 10 MG: 10 TABLET, FILM COATED ORAL at 08:37

## 2024-02-08 RX ADMIN — Medication 10 ML: at 20:28

## 2024-02-08 ASSESSMENT — PAIN SCALES - GENERAL: PAINLEVEL_OUTOF10: 6

## 2024-02-08 ASSESSMENT — PAIN DESCRIPTION - LOCATION: LOCATION: TOE (COMMENT WHICH ONE);FINGER (COMMENT WHICH ONE)

## 2024-02-08 ASSESSMENT — PAIN DESCRIPTION - ORIENTATION: ORIENTATION: RIGHT;LEFT

## 2024-02-08 ASSESSMENT — PAIN DESCRIPTION - DESCRIPTORS: DESCRIPTORS: ACHING;SHARP

## 2024-02-08 NOTE — PROGRESS NOTES
[facility]  Inpatient Progress Note    CC:         Shortness of breath, AF with RVR          HPI:   This is a 68 y.o. male came to the hospital with progressive shortness of breath over the last 7 to 10 days.  Along with this has been weight gain edema.  The patient's had stopped taking his medications.  In the emergency room he was hypertensive with AF rate of 150.  There was no chest pain complaint.    Patient has prior history of AF, systolic heart failure, HTN, DM and hepatitis C.       Interval history  Breathing has improved  On oral torsemide       Review of Systems -   Constitutional: Negative for weight gain/loss; malaise, fever  Respiratory: Negative for Asthma;  cough and hemoptysis  Cardiovascular: Negative for palpitations,dizziness   Gastrointestinal: Negative for abd.pain; constipation/diarrhea;    Genitourinary: Negative for stones; hematuria; frequency hesitancy  Integumentt: Negative for rash or pruritis  Hematologic/lymphatic: Negative for blood dyscrasia; leukemia/lymphoma  Musculoskeletal: Negative for Connective tissue disease  Neurological:  Negative for Seizure   Behavioral/Psych:Negative for Bipolar disorder, Schizophrenia; Dementia  Endocrine: negative for thyroid, parathyroid disease      Intake/Output Summary (Last 24 hours) at 2/8/2024 0906  Last data filed at 2/8/2024 0600  Gross per 24 hour   Intake 720 ml   Output 700 ml   Net 20 ml         Physical Examination:    /62   Pulse 83   Temp 97.4 °F (36.3 °C) (Oral)   Resp 20   Ht 1.727 m (5' 8\")   Wt 120.1 kg (264 lb 12.4 oz)   SpO2 97%   BMI 40.26 kg/m²   HEENT:  Face: Atraumatic, Conjunctiva: Pink; non icteric,  Mucous Memb:  Moist, No thyromegaly or Lymphadenopathy  Respiratory:  Resp Assessment: Normal, Resp Auscultation: clear   Cardiovascular:  Auscultation: nl S1 & S2, Palpation:  Nl PMI; No heaves or thrills, JVP:  normal  Abdomen: Soft, non-tender, Normal bowel sounds,  No organomegaly  Extremities: No  improved  Patient is on Jardiance Toprol-XL spironolactone and lisinopril as well as torsemide    I tried to explain to the patient regarding ICD.  He has very poor understanding of it and cannot make a decision.  He also has a history of noncompliance.  My opinion on this matter is that we should treat him medically for the near future.  And if he is compliant with medication and follow-up,  then and only then should consider ICD.    Atrial fibrillation  Controlled on Toprol  Eliquis 5 mg twice daily    Hypertension  128/68      Peripheral edema resolved  Contributed by amlodipine and Neurontin  Amlodipine has been discontinued I will reduce the dose of Neurontin to 200 3 times daily      Complexity of medical decision making-high LEANDRA Pendleton M.D  2/8/2024

## 2024-02-08 NOTE — PLAN OF CARE
Problem: Discharge Planning  Goal: Discharge to home or other facility with appropriate resources  2/7/2024 2329 by Toby Barber RN  Outcome: Progressing  Flowsheets (Taken 2/7/2024 1922)  Discharge to home or other facility with appropriate resources: Identify barriers to discharge with patient and caregiver     Problem: ABCDS Injury Assessment  Goal: Absence of physical injury  2/7/2024 2329 by Toby Barber RN  Outcome: Progressing     Problem: Safety - Adult  Goal: Free from fall injury  2/7/2024 2329 by Toby Barber RN  Outcome: Progressing     Problem: Respiratory - Adult  Goal: Achieves optimal ventilation and oxygenation  2/7/2024 2329 by Toby Barber RN  Outcome: Progressing  Flowsheets (Taken 2/7/2024 1922)  Achieves optimal ventilation and oxygenation: Assess for changes in respiratory status     Problem: Cardiovascular - Adult  Goal: Maintains optimal cardiac output and hemodynamic stability  2/7/2024 2329 by Toby Barber RN  Outcome: Progressing  Flowsheets (Taken 2/7/2024 1922)  Maintains optimal cardiac output and hemodynamic stability: Monitor blood pressure and heart rate     Problem: Cardiovascular - Adult  Goal: Absence of cardiac dysrhythmias or at baseline  2/7/2024 2329 by Toby Barber RN  Outcome: Progressing  Flowsheets (Taken 2/7/2024 1922)  Absence of cardiac dysrhythmias or at baseline: Monitor cardiac rate and rhythm     Problem: Musculoskeletal - Adult  Goal: Return ADL status to a safe level of function  2/7/2024 2329 by Toby Barber RN  Outcome: Progressing  Flowsheets (Taken 2/7/2024 1922)  Return ADL Status to a Safe Level of Function: Administer medication as ordered     Problem: Metabolic/Fluid and Electrolytes - Adult  Goal: Electrolytes maintained within normal limits  2/7/2024 2329 by Toby Barber RN  Outcome: Progressing  Flowsheets (Taken 2/7/2024 1922)  Electrolytes maintained within normal limits: Monitor labs and  assess patient for signs and symptoms of electrolyte imbalances

## 2024-02-08 NOTE — PROGRESS NOTES
Occupational Therapy  Facility/Department: [address] PROGRESSIVE CARE  Occupational Therapy Daily Treatment    Name: Kaleb Mcclellan  : 1955  MRN: [id number]  Date of Service: 2024    Discharge Recommendations:  Home with Home health OT, Home with nursing aide, S Level 1, Home with assist PRN  OT Equipment Recommendations  Other: may need O2, reacher, wide SA; shower chair for energy conservation  Kaleb Mcclellan scored a 21/24 on the AM-PAC ADL Inpatient form. Current research shows that an AM-PAC score of 18 or greater is typically associated with a discharge to the patient's home setting. Based on the patient's AM-PAC score, and their current ADL deficits, it is recommended that the patient have 2-3 sessions per week of Occupational Therapy at d/c to increase the patient's independence.  At this time, this patient demonstrates the endurance and safety to discharge home with HHOT and a follow up treatment frequency of 2-3x/wk.   Please see assessment section for further patient specific details.    If patient discharges prior to next session this note will serve as a discharge summary.  Please see below for the latest assessment towards goals.       Patient Diagnosis(es): The primary encounter diagnosis was Acute on chronic congestive heart failure, unspecified heart failure type (HCC). Diagnoses of Dyspnea, unspecified type and Atrial fibrillation with RVR (HCC) were also pertinent to this visit.  Past Medical History:  has a past medical history of A-fib (HCC), Arthritis, Bone spur, DDD (degenerative disc disease), DM (diabetes mellitus) (HCC), ETOH abuse, Hepatitis C, HTN (hypertension), Internal hemorrhoids, OA (osteoarthritis), Sleep apnea, Smoker, and Spinal stenosis.  Past Surgical History:  has no past surgical history on file.    Treatment Diagnosis: impaired endurance, ADLs, balance      Assessment   Performance deficits / Impairments: Decreased functional mobility ;Decreased  much help is needed for putting on and taking off regular lower body clothing?: None  How much help is needed for bathing (which includes washing, rinsing, drying)?: A Little  How much help is needed for toileting (which includes using toilet, bedpan, or urinal)?: A Little  How much help is needed for putting on and taking off regular upper body clothing?: A Little  How much help is needed for taking care of personal grooming?: None  How much help for eating meals?: None  AM-[facility] Inpatient Daily Activity Raw Score: 21  AM-PAC Inpatient ADL T-Scale Score : 44.27  ADL Inpatient CMS 0-100% Score: 32.79  ADL Inpatient CMS G-Code Modifier : CJ    Goals  Short Term Goals  Time Frame for Short Term Goals: by 2-3 sessions pt will complete.   Status: goals ongoing  Short Term Goal 1: UB dressing IND'ly  Short Term Goal 2: LB dressing w/ SBA using A/E-partially met, donned pants w/o AE. Anticpate may need assistance for footies, socks. Cont to address.  Short Term Goal 3: toilet tasks IND'ly  Short Term Goal 4: household t/f IND'ly  Short Term Goal 5: address DME needs  Patient Goals   Patient goals : return home w/ HH services       Therapy Time   Individual Concurrent Group Co-treatment   Time In 0955         Time Out 1020         Minutes 25         Timed Code Treatment Minutes: 25 Minutes       Wilton Diaz OTR/L [id number]

## 2024-02-08 NOTE — PROGRESS NOTES
V2.0    [facility] [place]      Name:  Kaleb Mcclellan /Age/Sex: 1955  (68 y.o. male)   MRN & CSN:  [id number] & [id number] Encounter Date/Time: 2024 6:41 PM EST   Location:  [id number] PCP: Enio Davalos MD     Attending:Jing Lee MD       Hospital Day: 6    Assessment and Recommendations   Kaleb Mcclellan is a 68 y.o. male with pmh of systolic CHF who presents with Acute on chronic systolic CHF (congestive heart failure) (HCC)      Plan:     Acute hypoxic respiratory failure  Severe mitral regurgitation  Acute systolic CHF  H/o COPD  - transitioned to oral torsemide per cardiology  - pulm following:  on CT chest patient still has pleural effusions which should improve with Bipap  - nees outpatient Bipap  - defer to cardiology for long term management of mitral valve      Diet ADULT DIET; Regular; Low Sodium (2 gm); 1800 ml   DVT Prophylaxis [] Lovenox, []  Heparin, [] SCDs, [] Ambulation,  [] Eliquis, [] Xarelto  [] Coumadin   Code Status Full Code             Personally reviewed Lab Studies and Imaging         Drugs that require monitoring for toxicity include torsemide and the method of monitoring was labs        Subjective:     Chief Complaint: hypoxia    Still on 6L oxygen      Review of Systems:      Pertinent positives and negatives discussed in HPI    Objective:     Intake/Output Summary (Last 24 hours) at 2024 1841  Last data filed at 2024 1653  Gross per 24 hour   Intake 960 ml   Output 1375 ml   Net -415 ml      Vitals:   Vitals:    24 1243 24 1343 24 1445 24 1705   BP:  (!) 150/98  123/74   Pulse: 88  97 84   Resp: 22      Temp:    97.6 °F (36.4 °C)   TempSrc:    Oral   SpO2: 96%  92% 95%   Weight:       Height:             Physical Exam:      General: NAD  Eyes: EOMI  ENT: neck supple  Cardiovascular: Regular rate.  Respiratory: Clear to auscultation  Gastrointestinal: Soft, non tender  Genitourinary: no suprapubic  tenderness  Musculoskeletal: No edema  Skin: warm, dry  Neuro: sleepy  Psych: Mood appropriate.         Medications:   Medications:    torsemide  20 mg Oral Daily    gabapentin  200 mg Oral TID    empagliflozin  10 mg Oral Daily    metoprolol succinate  50 mg Oral Daily    spironolactone  25 mg Oral Daily    potassium chloride  40 mEq Oral BID    apixaban  5 mg Oral BID    lisinopril  20 mg Oral Daily    mometasone-formoterol  2 puff Inhalation BID RT    citalopram  20 mg Oral Daily    ferrous sulfate  325 mg Oral BID WC    sodium chloride flush  5-40 mL IntraVENous 2 times per day    nicotine  1 patch TransDERmal Daily      Infusions:    sodium chloride       PRN Meds: ALPRAZolam, 1 mg, TID PRN  albuterol, 2.5 mg, Q4H PRN  ipratropium, 0.5 mg, Q4H PRN  albuterol sulfate HFA, 2 puff, Q6H PRN  sodium chloride flush, 5-40 mL, PRN  sodium chloride, , PRN  potassium chloride, 40 mEq, PRN   Or  potassium alternative oral replacement, 40 mEq, PRN   Or  potassium chloride, 10 mEq, PRN  magnesium sulfate, 2,000 mg, PRN  ondansetron, 4 mg, Q8H PRN   Or  ondansetron, 4 mg, Q6H PRN  polyethylene glycol, 17 g, Daily PRN  acetaminophen, 650 mg, Q6H PRN   Or  acetaminophen, 650 mg, Q6H PRN        Labs and Imaging   CT CHEST WO CONTRAST    Result Date: 2/6/2024  EXAMINATION: CT OF THE CHEST WITHOUT CONTRAST 2/6/2024 10:53 am TECHNIQUE: CT of the chest was performed without the administration of intravenous contrast. Multiplanar reformatted images are provided for review. Automated exposure control, iterative reconstruction, and/or weight based adjustment of the mA/kV was utilized to reduce the radiation dose to as low as reasonably achievable. COMPARISON: None. HISTORY: ORDERING SYSTEM PROVIDED HISTORY: abnormal cxr. TECHNOLOGIST PROVIDED HISTORY: Reason for exam:->abnormal cxr. Reason for Exam: abnormal cxr. FINDINGS: Mediastinum: No axillary adenopathy.  Shotty mediastinal lymph nodes. Calcified left hilar lymph node.

## 2024-02-08 NOTE — PROGRESS NOTES
Physical Therapy  Facility/Department: [address] PROGRESSIVE CARE  Physical Therapy Treatment Note    Name: Kaleb Mcclellan  : 1955  MRN: [id number]  Date of Service: 2024    Discharge Recommendations:  Continue to assess pending progress, Home with assist PRN   PT Equipment Recommendations  Other: Monitor for equipt needs.  Current Am-Pac .         Assessment   Body Structures, Functions, Activity Limitations Requiring Skilled Therapeutic Intervention: Decreased functional mobility ;Decreased strength;Decreased endurance;Decreased balance  Assessment: 67 y/o male admit 2/3/2024 with CHF, A-Fib with RVR. PMH as noted including CHF, COPD, A-Fib, Spinal Stenosis, Hep C.  PTA pt living with friends in multi family home with 1 step to enter and flight of steps to access 2nd floor home; reports independent daily care and functional mobility (appears unkept/poor hygiene).    Pt currently requiring O2 6L. Pt suzette oob, transfers/amb within hospital room setting with SBA. Improving O2 line awareness. Endurance limited. Sats remain at least 90% with amb short distance. Mobility alittle antalgic due to chronic back issues.   At this time, anticipate adequate progress for d/c home.  Will cont to monitor pt's progress.  Therapy Prognosis: Fair;Good  History: 67 y/o male admit 2/3/2024 with CHF, A-Fib with RVR. PMH as noted including CHF, COPD, A-Fib, Spinal Stenosis, Hep C.  Exam: See above.  Clinical Presentation: See above.  Barriers to Learning: Insight.  Requires PT Follow-Up: Yes  Activity Tolerance  Activity Tolerance: Patient limited by endurance  Activity Tolerance Comments: Pt currently requiring O2 6L.  Pt suzette oob, transfers/amb within hospital room setting with SBA.  Improving O2 line awareness.  Endurance limited.  Sats remain at least 90% with amb short distance. Mobility alittle antalgic due to chronic back issues.     Plan   Physical Therapy Plan  General Plan: 2-3 times per week  Current  Treatment Recommendations: Strengthening, Therapeutic activities, Functional mobility training, Transfer training, Gait training, Stair training, Safety education & training, Patient/Caregiver education & training  Safety Devices  Type of Devices: Bed alarm in place, Call light within reach, Gait belt, Left in bed, Nurse notified  Restraints  Restraints Initially in Place: No     Restrictions  Restrictions/Precautions  Restrictions/Precautions: Fall Risk  Position Activity Restriction  Other position/activity restrictions: O2 6L via High Flow.     Subjective   General  Chart Reviewed: Yes  Patient assessed for rehabilitation services?: Yes  Additional Pertinent Hx: 69 y/o male admit 2/3/2024 with CHF, A-Fib with RVR.  PMH as noted including CHF, COPD, A-Fib, Spinal Stenosis, Hep C.  Family / Caregiver Present: No  Referring Practitioner: Dr. Fletcher  Follows Commands: Within Functional Limits  Subjective  Subjective: Pt agreeable to PT Rx. Reports chronic low back pain.         Social/Functional History  Social/Functional History  Lives With: Friend(s)  Type of Home: House (2nd floor of multi family home.)  Home Layout: Multi-level (lives on 2nd fl)  Home Access: Stairs to enter without rails (1 step to enter; flight of steps to access 2nd floor home (no handrail).)  Entrance Stairs - Number of Steps: 1 step onto porch; inside full flight of steps w/ HR  Entrance Stairs - Rails: None  Bathroom Shower/Tub: Walk-in shower  Bathroom Toilet: Standard  Bathroom Accessibility: Accessible  Has the patient had two or more falls in the past year or any fall with injury in the past year?: Yes  ADL Assistance: Independent  Ambulation Assistance: Independent (Without assist device pta.)  Active : No (Relies on friends.)  Occupation: Retired  Type of Occupation: Retired : .  Leisure & Hobbies: reports drinking alcohol  IADL Comments: Pt appears very unkept.  Additional Comments: Nephew provides some assist with

## 2024-02-08 NOTE — PROGRESS NOTES
V2.0    [facility] Progress Note      Name:  Kaleb Mcclellan /Age/Sex: 1955  (68 y.o. male)   MRN & CSN:  [id number] & [id number] Encounter Date/Time: 2024 9:02 PM EST   Location:  [id number]/5250-01 PCP: Enio Davalos MD     Attending:Grupo Ann MD       Hospital Day: 5    Assessment and Recommendations   Kaleb Mcclellan is a 68 y.o. male with pmh of A-fib, diabetes, hypertension who presents with Acute on chronic systolic CHF (congestive heart failure) ([facility])      Plan:   Acute on chronic systolic heart failure: EF 25 to 30% that dropped to 15/20% on repeat echocardiogram.  NYHA class IV.  Improved with IV Lasix and transition to p.o. torsemide.  Continue lisinopril, metoprolol, Jardiance, Aldactone.  Cardiology following and EP to evaluate for ICD after he has been compliant with GDMT for at least 3 months and has repeat echocardiogram.  Severe mitral regurgitation: Noted on echocardiogram and cardiology following.  May benefit from mitral clip in the future.  Paroxysmal atrial fibrillation: Rate currently controlled.  Continue beta-blocker and Eliquis.  IV beta-blocker as needed.  Monitoring on telemetry.  Acute respiratory failure with hypoxia: Secondary to CHF and history of COPD.  Pulm following and would benefit from further workup for ESTELLA as outpatient.  Has been on BiPAP at night during hospitalization.    Diet ADULT DIET; Regular; Low Sodium (2 gm); 1800 ml   DVT Prophylaxis [] Lovenox, []  Heparin, [] SCDs, [] Ambulation,  [x] Eliquis, [] Xarelto  [] Coumadin   Code Status Full Code   Disposition From: home  Expected Disposition: home  Estimated Date of Discharge: 2 days, still on 6L   Patient requires continued admission due to respiratory failure   Surrogate Decision Maker/ VIR[place] Nichole     Personally reviewed Lab Studies and Imaging     Discussed management of the case with CM and probable home on DC      Drugs that require monitoring for toxicity include Torsemide and the  portable view of the chest.  Low lung volume.  Bilateral basilar and perihilar predominant heterogeneous opacities with left basilar consolidation.  Suspected left greater than right pleural effusions.  No pneumothorax.  Cardiomegaly.  Enlarged main pulmonary artery. Atherosclerotic thoracic aorta.  No acute osseous abnormality.     1.  Bilateral basilar and perihilar predominant heterogeneous opacities with left basilar consolidation.  Findings may represent atelectasis and pulmonary edema.  Underlying infection is difficult to exclude.  Recommend radiographic follow-up to resolution. 2.  Suspected left greater than right pleural effusions. 3.  Cardiomegaly.  Overall, findings may relate to CHF.       CBC:   Recent Labs     02/05/24 0740 02/06/24 0917 02/07/24  0454   WBC 6.3 7.6 7.6   HGB 14.5 15.9 14.8   * 141 128*     BMP:    Recent Labs     02/05/24 0740 02/06/24 0917 02/07/24 0454    142 139   K 3.4* 3.8 4.0   CL 97* 97* 97*   CO2 32 30 32   BUN 18 23* 27*   CREATININE 1.2 1.5* 1.5*   GLUCOSE 107* 126* 121*     Hepatic: No results for input(s): \"AST\", \"ALT\", \"ALB\", \"BILITOT\", \"ALKPHOS\" in the last 72 hours.  Lipids:   Lab Results   Component Value Date/Time    CHOL 125 10/27/2016 05:00 PM    HDL 30 10/27/2016 05:00 PM    HDL 35 10/03/2011 03:05 PM    TRIG 76 10/27/2016 05:00 PM     Hemoglobin A1C:   Lab Results   Component Value Date/Time    LABA1C 5.2 10/23/2014 12:00 AM     TSH:   Lab Results   Component Value Date/Time    TSH 0.27 10/27/2016 05:00 PM     Troponin: No results found for: \"TROPONINT\"  Lactic Acid: No results for input(s): \"LACTA\" in the last 72 hours.  BNP: No results for input(s): \"PROBNP\" in the last 72 hours.  UA:  Lab Results   Component Value Date/Time    NITRU Negative 02/03/2024 07:51 PM    COLORU Yellow 02/03/2024 07:51 PM    PHUR 6.0 02/03/2024 07:51 PM    WBCUA 0-2 02/03/2024 07:51 PM    RBCUA 3-4 02/03/2024 07:51 PM    MUCUS 1+ 02/03/2024 07:51 PM    BACTERIA Rare

## 2024-02-08 NOTE — PROGRESS NOTES
Slept well, easily aroused for routine checks. Wore bipap while asleep, tolerated well. In bed, call light in reach.

## 2024-02-08 NOTE — PROGRESS NOTES
No anxiety.  Awake. Alert. Intact judgement and insight. Good Mood / Affect.  Memory appears in tact       Medications:    Scheduled Meds:   torsemide  20 mg Oral Daily    gabapentin  200 mg Oral TID    empagliflozin  10 mg Oral Daily    metoprolol succinate  50 mg Oral Daily    spironolactone  25 mg Oral Daily    potassium chloride  40 mEq Oral BID    apixaban  5 mg Oral BID    lisinopril  20 mg Oral Daily    mometasone-formoterol  2 puff Inhalation BID RT    citalopram  20 mg Oral Daily    ferrous sulfate  325 mg Oral BID WC    sodium chloride flush  5-40 mL IntraVENous 2 times per day    nicotine  1 patch TransDERmal Daily       Continuous Infusions:   sodium chloride         PRN Meds:  ALPRAZolam, albuterol, ipratropium, albuterol sulfate HFA, sodium chloride flush, sodium chloride, potassium chloride **OR** potassium alternative oral replacement **OR** potassium chloride, magnesium sulfate, ondansetron **OR** ondansetron, polyethylene glycol, acetaminophen **OR** acetaminophen    Labs reviewed:  CBC:   Recent Labs     02/06/24 0917 02/07/24 0454 02/08/24  0535   WBC 7.6 7.6 8.0   HGB 15.9 14.8 14.5   HCT 48.2 43.9 43.7   MCV 85.4 84.1 84.6    128* 132*       BMP:   Recent Labs     02/06/24 0917 02/07/24 0454 02/08/24  0535    139 138   K 3.8 4.0 4.8   CL 97* 97* 98*   CO2 30 32 32   BUN 23* 27* 30*   CREATININE 1.5* 1.5* 1.4*       LIVER PROFILE: No results for input(s): \"AST\", \"ALT\", \"LIPASE\", \"AMYLASE\", \"ALB\", \"BILIDIR\", \"BILITOT\", \"ALKPHOS\" in the last 72 hours.  PT/INR: No results for input(s): \"PROTIME\", \"INR\" in the last 72 hours.  APTT: No results for input(s): \"APTT\" in the last 72 hours.  UA:No results for input(s): \"NITRITE\", \"COLORU\", \"PHUR\", \"LABCAST\", \"WBCUA\", \"RBCUA\", \"MUCUS\", \"TRICHOMONAS\", \"YEAST\", \"BACTERIA\", \"CLARITYU\", \"SPECGRAV\", \"LEUKOCYTESUR\", \"UROBILINOGEN\", \"BILIRUBINUR\", \"BLOODU\", \"GLUCOSEU\", \"AMORPHOUS\" in the last 72 hours.    Invalid input(s): \"KETONESU\"  No results  for input(s): \"PH\", \"PCO2\", \"PO2\" in the last 72 hours.    Cx:      Films:                This note was transcribed using Dragon Dictation software. Please disregard any translational errors.      BALTA KWOK   [facility]  [phone]

## 2024-02-09 ENCOUNTER — APPOINTMENT (OUTPATIENT)
Dept: CT IMAGING | Age: 69
End: 2024-02-09
Payer: MEDICARE

## 2024-02-09 LAB
ANION GAP SERPL CALCULATED.3IONS-SCNC: 7 MMOL/L (ref 3–16)
BASE EXCESS BLDA CALC-SCNC: 8.3 MMOL/L (ref -3–3)
BASOPHILS # BLD: 0.1 K/UL (ref 0–0.2)
BASOPHILS NFR BLD: 1.3 %
BUN SERPL-MCNC: 30 MG/DL (ref 7–20)
CALCIUM SERPL-MCNC: 9.7 MG/DL (ref 8.3–10.6)
CHLORIDE SERPL-SCNC: 94 MMOL/L (ref 99–110)
CO2 BLDA-SCNC: 37.9 MMOL/L
CO2 SERPL-SCNC: 34 MMOL/L (ref 21–32)
COHGB MFR BLDA: 1.4 % (ref 0–1.5)
CREAT SERPL-MCNC: 1.4 MG/DL (ref 0.8–1.3)
DEPRECATED RDW RBC AUTO: 15.6 % (ref 12.4–15.4)
EOSINOPHIL # BLD: 0.2 K/UL (ref 0–0.6)
EOSINOPHIL NFR BLD: 2.1 %
GFR SERPLBLD CREATININE-BSD FMLA CKD-EPI: 54 ML/MIN/{1.73_M2}
GLUCOSE SERPL-MCNC: 115 MG/DL (ref 70–99)
HCO3 BLDA-SCNC: 36 MMOL/L (ref 21–29)
HCT VFR BLD AUTO: 42.7 % (ref 40.5–52.5)
HGB BLD-MCNC: 14.4 G/DL (ref 13.5–17.5)
HGB BLDA-MCNC: 15.4 G/DL (ref 13.5–17.5)
LYMPHOCYTES # BLD: 1.1 K/UL (ref 1–5.1)
LYMPHOCYTES NFR BLD: 15.1 %
MCH RBC QN AUTO: 28.5 PG (ref 26–34)
MCHC RBC AUTO-ENTMCNC: 33.7 G/DL (ref 31–36)
MCV RBC AUTO: 84.8 FL (ref 80–100)
METHGB MFR BLDA: 0.3 %
MONOCYTES # BLD: 0.9 K/UL (ref 0–1.3)
MONOCYTES NFR BLD: 12.5 %
NEUTROPHILS # BLD: 5.1 K/UL (ref 1.7–7.7)
NEUTROPHILS NFR BLD: 69 %
O2 THERAPY: ABNORMAL
PCO2 BLDA: 60.7 MMHG (ref 35–45)
PH BLDA: 7.38 [PH] (ref 7.35–7.45)
PLATELET # BLD AUTO: 139 K/UL (ref 135–450)
PLATELET BLD QL SMEAR: ADEQUATE
PMV BLD AUTO: 13 FL (ref 5–10.5)
PO2 BLDA: 68.6 MMHG (ref 75–108)
POTASSIUM SERPL-SCNC: 5.1 MMOL/L (ref 3.5–5.1)
RBC # BLD AUTO: 5.03 M/UL (ref 4.2–5.9)
RBC MORPH BLD: NORMAL
SAO2 % BLDA: 94.2 %
SLIDE REVIEW: ABNORMAL
SODIUM SERPL-SCNC: 135 MMOL/L (ref 136–145)
WBC # BLD AUTO: 7.4 K/UL (ref 4–11)

## 2024-02-09 PROCEDURE — 99233 SBSQ HOSP IP/OBS HIGH 50: CPT | Performed by: INTERNAL MEDICINE

## 2024-02-09 PROCEDURE — 6370000000 HC RX 637 (ALT 250 FOR IP): Performed by: INTERNAL MEDICINE

## 2024-02-09 PROCEDURE — 37799 UNLISTED PX VASCULAR SURGERY: CPT

## 2024-02-09 PROCEDURE — 85025 COMPLETE CBC W/AUTO DIFF WBC: CPT

## 2024-02-09 PROCEDURE — 2580000003 HC RX 258: Performed by: INTERNAL MEDICINE

## 2024-02-09 PROCEDURE — 94660 CPAP INITIATION&MGMT: CPT

## 2024-02-09 PROCEDURE — 6370000000 HC RX 637 (ALT 250 FOR IP): Performed by: STUDENT IN AN ORGANIZED HEALTH CARE EDUCATION/TRAINING PROGRAM

## 2024-02-09 PROCEDURE — 80048 BASIC METABOLIC PNL TOTAL CA: CPT

## 2024-02-09 PROCEDURE — 6370000000 HC RX 637 (ALT 250 FOR IP): Performed by: NURSE PRACTITIONER

## 2024-02-09 PROCEDURE — 82803 BLOOD GASES ANY COMBINATION: CPT

## 2024-02-09 PROCEDURE — 2060000000 HC ICU INTERMEDIATE R&B

## 2024-02-09 PROCEDURE — 2700000000 HC OXYGEN THERAPY PER DAY

## 2024-02-09 PROCEDURE — 94760 N-INVAS EAR/PLS OXIMETRY 1: CPT

## 2024-02-09 PROCEDURE — 36415 COLL VENOUS BLD VENIPUNCTURE: CPT

## 2024-02-09 PROCEDURE — 97530 THERAPEUTIC ACTIVITIES: CPT

## 2024-02-09 PROCEDURE — 94640 AIRWAY INHALATION TREATMENT: CPT

## 2024-02-09 PROCEDURE — 70450 CT HEAD/BRAIN W/O DYE: CPT

## 2024-02-09 PROCEDURE — 94669 MECHANICAL CHEST WALL OSCILL: CPT

## 2024-02-09 RX ADMIN — APIXABAN 5 MG: 5 TABLET, FILM COATED ORAL at 07:43

## 2024-02-09 RX ADMIN — EMPAGLIFLOZIN 10 MG: 10 TABLET, FILM COATED ORAL at 07:43

## 2024-02-09 RX ADMIN — LISINOPRIL 20 MG: 20 TABLET ORAL at 16:02

## 2024-02-09 RX ADMIN — GABAPENTIN 200 MG: 100 CAPSULE ORAL at 16:02

## 2024-02-09 RX ADMIN — FERROUS SULFATE TAB 325 MG (65 MG ELEMENTAL FE) 325 MG: 325 (65 FE) TAB at 07:43

## 2024-02-09 RX ADMIN — ALPRAZOLAM 1 MG: 0.5 TABLET ORAL at 20:25

## 2024-02-09 RX ADMIN — MOMETASONE FUROATE AND FORMOTEROL FUMARATE DIHYDRATE 2 PUFF: 200; 5 AEROSOL RESPIRATORY (INHALATION) at 20:11

## 2024-02-09 RX ADMIN — MOMETASONE FUROATE AND FORMOTEROL FUMARATE DIHYDRATE 2 PUFF: 200; 5 AEROSOL RESPIRATORY (INHALATION) at 07:42

## 2024-02-09 RX ADMIN — TORSEMIDE 20 MG: 20 TABLET ORAL at 07:43

## 2024-02-09 RX ADMIN — ALPRAZOLAM 1 MG: 0.5 TABLET ORAL at 07:43

## 2024-02-09 RX ADMIN — APIXABAN 5 MG: 5 TABLET, FILM COATED ORAL at 20:25

## 2024-02-09 RX ADMIN — GABAPENTIN 200 MG: 100 CAPSULE ORAL at 07:43

## 2024-02-09 RX ADMIN — FERROUS SULFATE TAB 325 MG (65 MG ELEMENTAL FE) 325 MG: 325 (65 FE) TAB at 16:02

## 2024-02-09 RX ADMIN — GABAPENTIN 200 MG: 100 CAPSULE ORAL at 20:25

## 2024-02-09 RX ADMIN — METOPROLOL SUCCINATE 50 MG: 50 TABLET, EXTENDED RELEASE ORAL at 07:43

## 2024-02-09 RX ADMIN — SPIRONOLACTONE 25 MG: 25 TABLET ORAL at 07:43

## 2024-02-09 RX ADMIN — Medication 10 ML: at 20:30

## 2024-02-09 RX ADMIN — CITALOPRAM HYDROBROMIDE 20 MG: 20 TABLET ORAL at 07:43

## 2024-02-09 NOTE — PLAN OF CARE
Problem: Safety - Adult  Goal: Free from fall injury  2/9/2024 1034 by Mikala Bueno RN  Outcome: Progressing  Bed alarm on, patient has new non-skid socks applied for optimal , assistive staff notified of increased need for bed alarm and encouraging patient to use call light, call light within reach, bed in lowest position.

## 2024-02-09 NOTE — PROGRESS NOTES
[facility]  Inpatient Progress Note    CC:         Shortness of breath, AF with RVR          HPI:   This is a 68 y.o. male came to the hospital with progressive shortness of breath over the last 7 to 10 days.  Along with this has been weight gain edema.  The patient's had stopped taking his medications.  In the emergency room he was hypertensive with AF rate of 150.  There was no chest pain complaint.    Patient has prior history of AF, systolic heart failure, HTN, DM and hepatitis C.       Interval history  Patient was laying in his bed sleeping quite comfortably  He consents to his breathing have improved  The patient was post to go home today but unfortunately fell       Review of Systems -   Constitutional: Negative for weight gain/loss; malaise, fever  Respiratory: Negative for Asthma;  cough and hemoptysis  Cardiovascular: Negative for palpitations,dizziness   Gastrointestinal: Negative for abd.pain; constipation/diarrhea;    Genitourinary: Negative for stones; hematuria; frequency hesitancy  Integumentt: Negative for rash or pruritis  Hematologic/lymphatic: Negative for blood dyscrasia; leukemia/lymphoma  Musculoskeletal: Negative for Connective tissue disease  Neurological:  Negative for Seizure   Behavioral/Psych:Negative for Bipolar disorder, Schizophrenia; Dementia  Endocrine: negative for thyroid, parathyroid disease      Intake/Output Summary (Last 24 hours) at 2/9/2024 1353  Last data filed at 2/9/2024 1334  Gross per 24 hour   Intake 1520 ml   Output 1100 ml   Net 420 ml         Physical Examination:    BP 95/67   Pulse 88   Temp 98 °F (36.7 °C) (Oral)   Resp 19   Ht 1.727 m (5' 8\")   Wt 121 kg (266 lb 12.1 oz)   SpO2 92%   BMI 40.56 kg/m²   HEENT:  Face: Atraumatic, Conjunctiva: Pink; non icteric,  Mucous Memb:  Moist, No thyromegaly or Lymphadenopathy  Respiratory:  Resp Assessment: Normal, Resp Auscultation: clear   Cardiovascular:  Auscultation: nl S1 & S2, Palpation:  Nl PMI; No

## 2024-02-09 NOTE — CARE COORDINATION
Patient down to 3L O2, watch for home oxygen needs.  Patient post fall today per chart, watch for any changes in therapy recs.    Patient plans on returning home to girlfriends house.   Will continue to follow for discharge needs.    Electronically signed by Eden Vasquez RN Case Management on 2/9/2024 at 10:34 AM      Met with patient to discuss therapy recs for home care. Patient says he is open to home care but will be staying with his girlfriend at [address] [place] [address] and then returning home when able. Home care options provided. Patient says he has no preference and agrees to any agency that can staff & accept his insurance. Referral made to [facility], patient in agreement with this.   Will also need a DME wheeled walker per therapy recs. Watch for home O2 needs.    The Plan for Transition of Care is related to the following treatment goals: home care    The patient was provided with a choice of provider and agrees   with the discharge plan. [x] Yes [] No    Freedom of choice list was provided with basic dialogue that supports the patient's individualized plan of care/goals, treatment preferences and shares the quality data associated with the providers. [x] Yes [] No    Electronically signed by Eden Vasquez RN Case Management on 2/9/2024 at 1:22 PM

## 2024-02-09 NOTE — PROGRESS NOTES
Patient refusing to go back on NIV at this time. Electronically signed by Edilma Damico RCP on 2/9/2024 at 12:21 AM

## 2024-02-09 NOTE — PROGRESS NOTES
POST FALL MANAGEMENT    Kaleb Mcclellan  MEDICAL RECORD NUMBER:  [id number]  AGE: 68 y.o.   GENDER: male  : 1955  TODAYS DATE:  2024    Details     Fall Occurred: Yes    Was the Fall Witnessed:  No      Brief Review of Event:per patient report: patient stood to urinate in urinal and lost balance and fell. Patient was found calling out \"Nurse\" and RN found patient along the bed on the floor. Patient reported hitting head but endorses no other injury. Vitals checked and charted, STAT CT of the head ordered due to patient's unwitnessed fall, report of hitting head, and taking eliquis.          Who found the patient: ELIU Bach      Where was the patient at the time of the fall: Left side of bed on floor      Patient Comments: \"I just fell, I was getting up to pee like I always do and just fell. I am so embarrassed.\"       Date Fall Occurred:  2024 .       Time Fall Occurred: 9:50a.m.     Assessment     Post Fall Head to Toe Assessment Completed: Yes    Post Fall Predictive Analytic Score Reviewed: Yes     Post Fall Vitals Completed: Yes    Post Fall Neuro Checks Completed: Yes    Injury Occurred(if yes, describe injury):  no           Did the Patient Experience:(Check Manny all that apply)    [x] Patient hit head  [] Loss of consciousness  [] Change in mental status following the fall  [x] Patient is on an anticoagulant medication      CT Performed:  yes    Follow-up     Persons Notified of Fall:  (Provide names of persons notified)   [x] Physician: Jing Lee  [] DOMINGUEZ:  [] Nursing Supervisior:  [x] Manager: Kristi Lamas  [] Pharmacist:  [] Family: Patient refused  [x] Other: Charge ELIU Ventura      Electronically signed by Mikala Bueno RN 2024 at 10:28 AM

## 2024-02-09 NOTE — PLAN OF CARE
Problem: Discharge Planning  Goal: Discharge to home or other facility with appropriate resources  Outcome: Progressing  Flowsheets (Taken 2/8/2024 2037)  Discharge to home or other facility with appropriate resources:   Identify barriers to discharge with patient and caregiver   Arrange for needed discharge resources and transportation as appropriate   Identify discharge learning needs (meds, wound care, etc)     Problem: ABCDS Injury Assessment  Goal: Absence of physical injury  Outcome: Progressing     Problem: Safety - Adult  Goal: Free from fall injury  Outcome: Progressing     Problem: Respiratory - Adult  Goal: Achieves optimal ventilation and oxygenation  Outcome: Progressing     Problem: Cardiovascular - Adult  Goal: Maintains optimal cardiac output and hemodynamic stability  Outcome: Progressing     Problem: Cardiovascular - Adult  Goal: Absence of cardiac dysrhythmias or at baseline  Outcome: Progressing     Problem: Musculoskeletal - Adult  Goal: Return mobility to safest level of function  Outcome: Progressing  Flowsheets (Taken 2/8/2024 2037)  Return Mobility to Safest Level of Function:   Assess patient stability and activity tolerance for standing, transferring and ambulating with or without assistive devices   Assist with transfers and ambulation using safe patient handling equipment as needed   Ensure adequate protection for wounds/incisions during mobilization   Obtain physical therapy/occupational therapy consults as needed     Problem: Musculoskeletal - Adult  Goal: Maintain proper alignment of affected body part  Outcome: Progressing  Flowsheets (Taken 2/8/2024 2037)  Maintain proper alignment of affected body part:   Support and protect limb and body alignment per provider's orders   Instruct and reinforce with patient and family use of appropriate assistive device and precautions (e.g. spinal or hip dislocation precautions)     Problem: Musculoskeletal - Adult  Goal: Return ADL status to a

## 2024-02-09 NOTE — CARE COORDINATION
[facility]    Referral received from  to follow for home care services.   [facility] unable to staff timely, will require alternate agency, no preference, , aware.     Referral accepted by Audrey with [facility], will pull from [facility].    Faisal Sarmiento RN, BSN CTN  [facility] [phone]

## 2024-02-09 NOTE — PROGRESS NOTES
Occupational Therapy Attempt  Kaleb Mcclellan  2/9/2024  [id number]    Pt chart reviewed, OK to see per RN. Pt sleeping in bed, declining therapy, did not have O2 in his nose. This therapist placed O2 back in his nose and checked his SPO2. Pt at 97%. Pt left w/ call light within reach and bed alarm on.    Electronically signed by Wilton Diaz, OTR/L [id number] on 2/9/24 at 2:39 PM EST

## 2024-02-09 NOTE — PROGRESS NOTES
Pulmonary Progress Note    Date of Admission: 2/3/2024   LOS: 6 days       CC:  Chief Complaint   Patient presents with    Shortness of Breath     Increased shortness of breath x1 week. Patient reports cough with yellow sputum which resolved around taht time. Reports some chest soreness with cough. Also reports body spinning sensation x4 days which is worse with movement. Hx of COPD. Denies hx of stroke or cardiac problems.        Subjective:  Sleeping    ROS:        Assessment:     Obesity  COPD  Sleep apnea  Tobacco abuse    Plan:     This note may have been transcribed using Dragon Dictation software. Please disregard any translational errors.       Hospital Day: 6     Cardiovascular: EF 30%  Per cardiology   I/O improving        Fall  Going to head CT to assess   Check ABG    ESTELLA  Will be further worked up as an outpatient     Acute hypoxemic respiratory failure saturations less than 90% on room air  Weaned to 90% saturation  Currently 3 L NC   With improvement, was going to discuss d/c today on o2.  This would be beneficial with ESTELLA.  However, not discussed with fall today.         Acute hypercapnic respiratory failure   AVAPS tidal volume 500,, EPAP 10 to treat sleep apnea with hypercapnia  Follow up ABG ordered as per above.      COPD   No pft on file.  Smoker.   Pulm medications     Abnormal radiograph  CT chest consistent with pleural effusions.  As expected, pseudotumor noted on CT chest.  Consistent with CHF.    diuresis.                  Data:        PHYSICAL EXAM:   Blood pressure 95/67, pulse 87, temperature 98 °F (36.7 °C), temperature source Oral, resp. rate 19, height 1.727 m (5' 8\"), weight 121 kg (266 lb 12.1 oz), SpO2 94 %.'  Body mass index is 40.56 kg/m².   Gen: No distress.    ENT:   Resp: No accessory muscle use. No crackles. No wheezes. No rhonchi.    CV: Regular rate. Regular rhythm. No murmur or rub. No edema.   Skin: Warm, dry, normal texture and turgor. No nodule on exposed extremities.

## 2024-02-09 NOTE — DISCHARGE INSTR - COC
Continuity of Care Form    Patient Name: Kaleb Mcclellan   :  1955  MRN:  [id number]    Admit date:  2/3/2024  Discharge date:  ***    Code Status Order: Full Code   Advance Directives:   Advance Care Flowsheet Documentation       Date/Time Healthcare Directive Type of Healthcare Directive Copy in Chart Healthcare Agent Appointed Healthcare Agent's Name Healthcare Agent's Phone Number    [phone] No, patient does not have an advance directive for healthcare treatment -- -- -- -- --            Admitting Physician:  Vasyl Whitmore MD  PCP: Enio Davalos MD    Discharging Nurse: ***  Discharging Hospital Unit/Room#: N7O-1296/5250-01  Discharging Unit Phone Number: ***    Emergency Contact:   Extended Emergency Contact Information  Primary Emergency Contact: Dionisio Dey   [facility]  Home Phone: [phone]  Relation: Other    Past Surgical History:  History reviewed. No pertinent surgical history.    Immunization History:   Immunization History   Administered Date(s) Administered    Influenza 10/03/2013    TDaP, ADACEL (age 10y-64y), BOOSTRIX (age 10y+), IM, 0.5mL 2014       Active Problems:  Patient Active Problem List   Diagnosis Code    A-fib (HCC) I48.91    DM (diabetes mellitus) (HCC) E11.9    Arthritis M19.90    Smoker F17.200    Internal hemorrhoids K64.8    Hepatitis C B19.20    Spinal stenosis M48.00    DDD (degenerative disc disease) JUA1813    Bone spur M77.9    Chest pain R07.9    Open facial wound S01.80XA    Acute on chronic systolic CHF (congestive heart failure) (HCC) I50.23    Acute on chronic congestive heart failure (HCC) I50.9    Hypokalemia E87.6       Isolation/Infection:   Isolation            No Isolation          Patient Infection Status       None to display                     Nurse Assessment:  Last Vital Signs: BP 95/67   Pulse 88   Temp 98 °F (36.7 °C) (Oral)   Resp 19   Ht 1.727 m (5' 8\")   Wt 121 kg (266 lb 12.1 oz)   SpO2 92%   BMI  ***    Patient's personal belongings (please select all that are sent with patient):  {CHP DME Belongings:[id number]}    RN SIGNATURE:  {Esignature:[id number]}    CASE MANAGEMENT/SOCIAL WORK SECTION    Inpatient Status Date: 2/3/24    Readmission Risk Assessment Score:  Readmission Risk              Risk of Unplanned Readmission:  16           Discharging to Facility/ Agency   Name: [facility]  Address:   [address] [place] [address]  Phone:  [phone]  Fax:  [phone]      Dialysis Facility (if applicable)   Name:  Address:  Dialysis Schedule:  Phone:  Fax:    / signature: Electronically signed by Eden Vasquez RN Case Management on 2/9/24 at 2:38 PM EST    PHYSICIAN SECTION    Prognosis: Good    Condition at Discharge: Stable    Rehab Potential (if transferring to Rehab): Good    Recommended Labs or Other Treatments After Discharge: PT and OT:  Evaluate and treat.    Physician Certification: I certify the above information and transfer of Kaleb Mcclellan  is necessary for the continuing treatment of the diagnosis listed and that he requires Home Care for less 30 days.     Update Admission H&P: No change in H&P    PHYSICIAN SIGNATURE:  Electronically signed by Jing Lee MD on 2/10/24 at 4:00 PM EST

## 2024-02-09 NOTE — PROGRESS NOTES
V2.0    [facility] [place]      Name:  Kaleb Mcclellan /Age/Sex: 1955  (68 y.o. male)   MRN & CSN:  [id number] & [id number] Encounter Date/Time: 2024 6:41 PM EST   Location:  [id number]/5250-01 PCP: Enio Davalos MD     Attending:Jing Lee MD       Hospital Day: 7    Assessment and Recommendations   Kaleb Mcclellan is a 68 y.o. male with pmh of systolic CHF who presents with Acute on chronic systolic CHF (congestive heart failure) (HCC)      Plan:     Acute hypoxic respiratory failure  Severe mitral regurgitation  Acute systolic CHF  H/o COPD  - transitioned to oral torsemide per cardiology  - pulm following:  on CT chest patient still has pleural effusions which should improve with Bipap  - nees outpatient Bipap  - defer to cardiology for long term management of mitral valve    Afib  - cont metoprolol and eliquis      Diet ADULT DIET; Regular; Low Sodium (2 gm); 1800 ml   DVT Prophylaxis [] Lovenox, []  Heparin, [] SCDs, [] Ambulation,  [] Eliquis, [] Xarelto  [] Coumadin   Code Status Full Code             Personally reviewed Lab Studies and Imaging         Drugs that require monitoring for toxicity include torsemide and the method of monitoring was labs        Subjective:     Chief Complaint: hypoxia    Weaned to 2L.    Had a fall out of bed today.  Head CT negative      Review of Systems:      Pertinent positives and negatives discussed in HPI    Objective:     Intake/Output Summary (Last 24 hours) at 2024 1720  Last data filed at 2024 1334  Gross per 24 hour   Intake 1520 ml   Output 625 ml   Net 895 ml        Vitals:   Vitals:    24 1016 24 1132 24 1544 24 1620   BP: 95/67  (!) 147/87    Pulse: 87 88  98   Resp: 19      Temp: 98 °F (36.7 °C)  98.4 °F (36.9 °C)    TempSrc: Oral  Oral    SpO2: 94% 92%  90%   Weight:       Height:             Physical Exam:      General: NAD  Eyes: EOMI  ENT: neck supple  Cardiovascular: Regular rate.  Respiratory: Clear  adenopathy.  Shotty mediastinal lymph nodes. Calcified left hilar lymph node.  Enlargement heterogeneity of the right lobe of the thyroid.  Possible surgical absence of the left lobe of the thyroid. Atherosclerotic calcifications involving the thoracic aorta and coronary arteries.  Great vessels are normal in position and size.  Global enlargement of the heart.  Small-moderate pericardial effusion. Lungs/pleura: Small bilateral pleural effusions.  Associated adjacent atelectasis.  Dependent atelectasis in the left upper lobe.  No pneumothorax. No suspicious pulmonary nodularity.  Calcified lymph node adjacent to the left major fissure.  Central airways are patent. Upper Abdomen: Calcified granulomas in the liver and spleen.  2.5 cm left adrenal lesion which has Hounsfield units of 8.  Right adrenal gland is normal.  Calcified gallstone versus sludge in the dependent gallbladder. Remainder of the imaged upper abdominal organs are unremarkable in appearance. Soft Tissues/Bones: Bridging osteophytes at multiple levels in the thoracic spine.  No acute osseous abnormality identified.     Small bilateral pleural effusions with adjacent atelectasis. Cardiomegaly with small-moderate pericardial effusion. 2.5 cm left adrenal lesion which has Hounsfield units of 8. This is consistent with an adrenal adenoma. Calcified gallstone versus sludge in the dependent gallbladder. Enlargement and heterogeneity of the right lobe of the thyroid. Possible surgical absence of the left lobe of the thyroid.  Nonemergent further evaluation with thyroid ultrasound may be helpful if not recently performed. RECOMMENDATIONS: 2.5 cm left adrenal mass, consistent with lipid-rich benign adenoma. No follow-up imaging is recommended. JACR 2017 Aug; 14(8):1038-44, JCAT 2016 Mar-Apr; 40(2):194-200, Urol J 2006 Spring; 3(2):71-4.     XR CHEST PORTABLE    Result Date: 2/5/2024  EXAMINATION: ONE XRAY VIEW OF THE CHEST 2/5/2024 4:53 am COMPARISON: February

## 2024-02-09 NOTE — PLAN OF CARE
Problem: Discharge Planning  Goal: Discharge to home or other facility with appropriate resources  2/9/2024 0750 by Mikala Bueno RN  Outcome: Progressing  2/8/2024 2109 by Delmar Sahu RN  Outcome: Progressing  Flowsheets (Taken 2/8/2024 2037)  Discharge to home or other facility with appropriate resources:   Identify barriers to discharge with patient and caregiver   Arrange for needed discharge resources and transportation as appropriate   Identify discharge learning needs (meds, wound care, etc)     Problem: ABCDS Injury Assessment  Goal: Absence of physical injury  2/9/2024 0750 by Mikala Bueno RN  Outcome: Progressing  2/8/2024 2109 by Delmar Sahu RN  Outcome: Progressing     Problem: Safety - Adult  Goal: Free from fall injury  2/9/2024 0750 by Mikala Bueno RN  Outcome: Progressing  2/8/2024 2109 by Delmar Sahu RN  Outcome: Progressing     Problem: Respiratory - Adult  Goal: Achieves optimal ventilation and oxygenation  2/9/2024 0750 by Mikala Bueno RN  Outcome: Progressing  2/8/2024 2109 by Delmar Sahu RN  Outcome: Progressing     Problem: Cardiovascular - Adult  Goal: Maintains optimal cardiac output and hemodynamic stability  2/9/2024 0750 by Mikala Bueno RN  Outcome: Progressing  2/8/2024 2109 by Delmar Sahu RN  Outcome: Progressing  Goal: Absence of cardiac dysrhythmias or at baseline  2/9/2024 0750 by Mikala Bueno RN  Outcome: Progressing  2/8/2024 2109 by Delmar Sahu RN  Outcome: Progressing     Problem: Musculoskeletal - Adult  Goal: Return mobility to safest level of function  2/9/2024 0750 by Mikala Bueno RN  Outcome: Progressing  2/8/2024 2109 by Delmar Sahu RN  Outcome: Progressing  Flowsheets (Taken 2/8/2024 2037)  Return Mobility to Safest Level of Function:   Assess patient stability and activity tolerance for standing, transferring and ambulating with or without assistive devices   Assist with transfers and ambulation using safe patient handling equipment as needed

## 2024-02-09 NOTE — PROGRESS NOTES
Physical Therapy  Facility/Department: [address] PROGRESSIVE CARE  Daily Treatment Note  NAME: Kaleb Mcclellan  : 1955  MRN: [id number]    Date of Service: 2024    Discharge Recommendations:  Patient would benefit from continued therapy after discharge, Home with Home health PT   PT Equipment Recommendations  Equipment Needed: Yes  Mobility Devices: Walker  Walker: Rolling    Patient Diagnosis(es): The primary encounter diagnosis was Acute on chronic congestive heart failure, unspecified heart failure type (HCC). Diagnoses of Dyspnea, unspecified type and Atrial fibrillation with RVR ([facility]) were also pertinent to this visit.    Assessment   Assessment: Pt agreeable to using RW for ambulation after fall earlier today (CT head negative).  Mildly unsteady, antalgic gait with walker to/from commode.  He is agreeable to receiving a RW for home use to maximize his stability ambulating household distances.  Also recommend HHPT to continue to improve strength, balance, endurance, safety awareness, independence ambulating.    Kaleb Mcclellan scored a 18/24 on the -PAC short mobility form.     If patient discharges prior to next session this note will serve as a discharge summary.  Please see below for the latest assessment towards goals.     Activity Tolerance: Patient limited by endurance  Equipment Needed: Yes  Mobility Devices: Walker     Plan    Physical Therapy Plan  General Plan: 2-3 times per week  Current Treatment Recommendations: Strengthening;Therapeutic activities;Functional mobility training;Transfer training;Gait training;Stair training;Safety education & training;Patient/Caregiver education & training     Restrictions  Restrictions/Precautions  Restrictions/Precautions: Fall Risk  Position Activity Restriction  Other position/activity restrictions: O2 6L via High Flow.     Subjective    Subjective  Subjective: Pt fell this morning while standing to urinate.  CT head negative.  He is unsure why

## 2024-02-09 NOTE — PROGRESS NOTES
POST FALL MANAGEMENT    Kaleb Mcclellan  MEDICAL RECORD NUMBER:  [id number]  AGE: 68 y.o.   GENDER: male  : 1955  TODAYS DATE:  2024    Details     Fall Occurred: Yes    Was the Fall Witnessed:  No    Brief Review of Event:per patient report: patient stood to urinate in urinal and lost balance and fell. Patient was found calling out \"Nurse\" and RN found patient along the bed on the floor. Patient reported hitting head but endorses no other injury. Vitals checked and charted, STAT CT of the head ordered due to patient's unwitnessed fall, report of hitting head, and taking eliquis.      Date Fall Occurred:  2024 .       Time Fall Occurred: 9:50a.m.     Assessment     Post Fall Head to Toe Assessment Completed: Yes    Post Fall Owens and ABCDS Completed: Yes    Injury Occurred:  unknown    Did the Patient Experience:( Manny all that apply)    [] Loss of consciousness  [] Change in mental status following the fall  [] Injuries exceeding minor hematoma and lacerations (restrictions in mobility, joint             range of motion or change in weight bearing status)  [x] Patient is on an anticoagulant medication  [] Unknown if patient hit head    *If any of the boxes are checked, obtain a Head CT w/o Contrast & increase vital signs and neuro checks to q2h.       CT Performed:  Yes  Increased VS/Neuro checks to q2h:  Yes    Follow-up     Persons Notified of Fall:  (Manny all that apply and provide names of persons notified)   [x] Physician: Jing Lee  [] NP:  [] Nursing Supervisior:  [x] Manager: Kristi Lamas  [] Family:  [x] Other: Charge ELIU Audrey      Electronically signed by Mikala Bueno RN 2024 at 10:20 AM

## 2024-02-10 VITALS
TEMPERATURE: 97.8 F | BODY MASS INDEX: 40.6 KG/M2 | SYSTOLIC BLOOD PRESSURE: 143 MMHG | HEIGHT: 68 IN | RESPIRATION RATE: 16 BRPM | WEIGHT: 267.86 LBS | DIASTOLIC BLOOD PRESSURE: 80 MMHG | HEART RATE: 102 BPM | OXYGEN SATURATION: 92 %

## 2024-02-10 PROBLEM — J44.9 CHRONIC OBSTRUCTIVE PULMONARY DISEASE (HCC): Status: ACTIVE | Noted: 2024-02-10

## 2024-02-10 PROBLEM — G47.33 OSA (OBSTRUCTIVE SLEEP APNEA): Status: ACTIVE | Noted: 2024-02-10
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
## 2024-02-10 PROBLEM — J96.02 ACUTE RESPIRATORY FAILURE WITH HYPOXIA AND HYPERCAPNIA (HCC): Status: ACTIVE | Noted: 2024-02-10

## 2024-02-10 PROBLEM — J96.01 ACUTE RESPIRATORY FAILURE WITH HYPOXIA AND HYPERCAPNIA (HCC): Status: ACTIVE | Noted: 2024-02-10

## 2024-02-10 LAB
ANION GAP SERPL CALCULATED.3IONS-SCNC: 7 MMOL/L (ref 3–16)
BASOPHILS # BLD: 0.1 K/UL (ref 0–0.2)
BASOPHILS NFR BLD: 1.1 %
BUN SERPL-MCNC: 28 MG/DL (ref 7–20)
CALCIUM SERPL-MCNC: 9.4 MG/DL (ref 8.3–10.6)
CHLORIDE SERPL-SCNC: 94 MMOL/L (ref 99–110)
CO2 SERPL-SCNC: 32 MMOL/L (ref 21–32)
CREAT SERPL-MCNC: 1.2 MG/DL (ref 0.8–1.3)
DEPRECATED RDW RBC AUTO: 15.7 % (ref 12.4–15.4)
EOSINOPHIL # BLD: 0.2 K/UL (ref 0–0.6)
EOSINOPHIL NFR BLD: 2.4 %
GFR SERPLBLD CREATININE-BSD FMLA CKD-EPI: >60 ML/MIN/{1.73_M2}
GLUCOSE SERPL-MCNC: 122 MG/DL (ref 70–99)
HCT VFR BLD AUTO: 43.2 % (ref 40.5–52.5)
HGB BLD-MCNC: 14.4 G/DL (ref 13.5–17.5)
LYMPHOCYTES # BLD: 0.9 K/UL (ref 1–5.1)
LYMPHOCYTES NFR BLD: 11.1 %
MCH RBC QN AUTO: 28.4 PG (ref 26–34)
MCHC RBC AUTO-ENTMCNC: 33.4 G/DL (ref 31–36)
MCV RBC AUTO: 85.2 FL (ref 80–100)
MONOCYTES # BLD: 1.1 K/UL (ref 0–1.3)
MONOCYTES NFR BLD: 13.2 %
NEUTROPHILS # BLD: 5.9 K/UL (ref 1.7–7.7)
NEUTROPHILS NFR BLD: 72.2 %
PLATELET # BLD AUTO: 134 K/UL (ref 135–450)
PLATELET BLD QL SMEAR: ABNORMAL
PMV BLD AUTO: 13 FL (ref 5–10.5)
POTASSIUM SERPL-SCNC: 4.8 MMOL/L (ref 3.5–5.1)
RBC # BLD AUTO: 5.08 M/UL (ref 4.2–5.9)
SLIDE REVIEW: ABNORMAL
SODIUM SERPL-SCNC: 133 MMOL/L (ref 136–145)
WBC # BLD AUTO: 8.1 K/UL (ref 4–11)

## 2024-02-10 PROCEDURE — 6370000000 HC RX 637 (ALT 250 FOR IP): Performed by: INTERNAL MEDICINE

## 2024-02-10 PROCEDURE — 94640 AIRWAY INHALATION TREATMENT: CPT

## 2024-02-10 PROCEDURE — 99231 SBSQ HOSP IP/OBS SF/LOW 25: CPT | Performed by: NURSE PRACTITIONER

## 2024-02-10 PROCEDURE — 6370000000 HC RX 637 (ALT 250 FOR IP): Performed by: STUDENT IN AN ORGANIZED HEALTH CARE EDUCATION/TRAINING PROGRAM

## 2024-02-10 PROCEDURE — 36415 COLL VENOUS BLD VENIPUNCTURE: CPT

## 2024-02-10 PROCEDURE — 80048 BASIC METABOLIC PNL TOTAL CA: CPT

## 2024-02-10 PROCEDURE — 94660 CPAP INITIATION&MGMT: CPT

## 2024-02-10 PROCEDURE — 94760 N-INVAS EAR/PLS OXIMETRY 1: CPT

## 2024-02-10 PROCEDURE — 94669 MECHANICAL CHEST WALL OSCILL: CPT

## 2024-02-10 PROCEDURE — 94680 O2 UPTK RST&XERS DIR SIMPLE: CPT

## 2024-02-10 PROCEDURE — 85025 COMPLETE CBC W/AUTO DIFF WBC: CPT

## 2024-02-10 RX ORDER — TORSEMIDE 20 MG/1
20 TABLET ORAL DAILY
Qty: 30 TABLET | Refills: 3 | Status: SHIPPED | OUTPATIENT
Start: 2024-02-11

## 2024-02-10 RX ORDER — METOPROLOL SUCCINATE 50 MG/1
50 TABLET, EXTENDED RELEASE ORAL DAILY
Qty: 30 TABLET | Refills: 3 | Status: SHIPPED | OUTPATIENT
Start: 2024-02-11

## 2024-02-10 RX ORDER — LISINOPRIL 20 MG/1
20 TABLET ORAL DAILY
Qty: 30 TABLET | Refills: 3 | Status: SHIPPED | OUTPATIENT
Start: 2024-02-11

## 2024-02-10 RX ORDER — GABAPENTIN 100 MG/1
200 CAPSULE ORAL 3 TIMES DAILY
Qty: 90 CAPSULE | Refills: 0 | Status: SHIPPED | OUTPATIENT
Start: 2024-02-10 | End: 2024-03-11

## 2024-02-10 RX ORDER — SPIRONOLACTONE 25 MG/1
25 TABLET ORAL DAILY
Qty: 30 TABLET | Refills: 3 | Status: SHIPPED | OUTPATIENT
Start: 2024-02-11

## 2024-02-10 RX ADMIN — SPIRONOLACTONE 25 MG: 25 TABLET ORAL at 08:09

## 2024-02-10 RX ADMIN — MOMETASONE FUROATE AND FORMOTEROL FUMARATE DIHYDRATE 2 PUFF: 200; 5 AEROSOL RESPIRATORY (INHALATION) at 08:49

## 2024-02-10 RX ADMIN — FERROUS SULFATE TAB 325 MG (65 MG ELEMENTAL FE) 325 MG: 325 (65 FE) TAB at 08:09

## 2024-02-10 RX ADMIN — APIXABAN 5 MG: 5 TABLET, FILM COATED ORAL at 08:09

## 2024-02-10 RX ADMIN — CITALOPRAM HYDROBROMIDE 20 MG: 20 TABLET ORAL at 08:09

## 2024-02-10 RX ADMIN — GABAPENTIN 200 MG: 100 CAPSULE ORAL at 08:09

## 2024-02-10 RX ADMIN — EMPAGLIFLOZIN 10 MG: 10 TABLET, FILM COATED ORAL at 08:09

## 2024-02-10 RX ADMIN — METOPROLOL SUCCINATE 50 MG: 50 TABLET, EXTENDED RELEASE ORAL at 08:09

## 2024-02-10 RX ADMIN — GABAPENTIN 200 MG: 100 CAPSULE ORAL at 13:48

## 2024-02-10 RX ADMIN — TORSEMIDE 20 MG: 20 TABLET ORAL at 08:09

## 2024-02-10 RX ADMIN — LISINOPRIL 20 MG: 20 TABLET ORAL at 12:06

## 2024-02-10 NOTE — PROGRESS NOTES
Placed on AVAPS at this time   02/09/24 2330   NIV Type   NIV Started/Stopped On   Equipment Type V60   Mode AVAPS   Mask Type Full face mask   Mask Size Large   Assessment   Pulse 90   SpO2 93 %   Comfort Level Good   Using Accessory Muscles No   Mask Compliance Good   Skin Assessment Clean, dry, & intact   Skin Protection for O2 Device Yes   Orientation Middle   Location Nose   Intervention(s) Skin Barrier   Breath Sounds   Breath Sounds Bilateral Diminished   Settings/Measurements   PIP Observed 18 cm H20   CPAP/EPAP 10 cmH2O   IPAP Min 15 cmH2O   IPAP Max 25 cmH2O   Vt (Set, mL) 500 mL   Vt (Measured) 392 mL   Rate Ordered 4   Insp Rise Time (%) 3 %   FiO2  40 %   I Time/ I Time % 0.85 s   Minute Volume (L/min) 12.6 Liters   Mask Leak (lpm) 55 lpm   Patient's Home Machine No   Alarm Settings   Alarms On Y   Low Pressure (cmH2O) 4 cmH2O   High Pressure (cmH2O) 30 cmH2O   Delay Alarm 30 sec(s)   Apnea (secs) 30 secs   RR Low (bpm) 10   RR High (bpm) 40 br/min   Oxygen Therapy/Pulse Ox   O2 Therapy Oxygen   O2 Device PAP (positive airway pressure)   Pulse Oximeter Device Mode Continuous   Pulse Oximeter Device Location Finger

## 2024-02-10 NOTE — PLAN OF CARE
Patient discharged home per MD order. Patient verbalizes understanding of all discharge instructions. IV removed intact. All belongings with patient at discharge  Problem: Discharge Planning  Goal: Discharge to home or other facility with appropriate resources  2/10/2024 1352 by Eden Marshall RN  Outcome: Adequate for Discharge  2/10/2024 0606 by Tanya Cornejo RN  Outcome: Progressing     Problem: ABCDS Injury Assessment  Goal: Absence of physical injury  2/10/2024 1352 by Eden Marshall RN  Outcome: Adequate for Discharge  2/10/2024 0606 by Tanya Cornejo RN  Outcome: Progressing     Problem: Safety - Adult  Goal: Free from fall injury  2/10/2024 1352 by Eden Marshall RN  Outcome: Adequate for Discharge  2/10/2024 0606 by Tanya Cornejo RN  Outcome: Progressing     Problem: Respiratory - Adult  Goal: Achieves optimal ventilation and oxygenation  2/10/2024 1352 by Eden Marshall RN  Outcome: Adequate for Discharge  2/10/2024 0606 by Tanya Cornejo RN  Outcome: Progressing     Problem: Cardiovascular - Adult  Goal: Maintains optimal cardiac output and hemodynamic stability  2/10/2024 1352 by Eden Marshall RN  Outcome: Adequate for Discharge  2/10/2024 0606 by Tanya Cornejo RN  Outcome: Progressing  Goal: Absence of cardiac dysrhythmias or at baseline  Outcome: Adequate for Discharge     Problem: Musculoskeletal - Adult  Goal: Return mobility to safest level of function  Outcome: Adequate for Discharge  Goal: Maintain proper alignment of affected body part  Outcome: Adequate for Discharge  Goal: Return ADL status to a safe level of function  Outcome: Adequate for Discharge     Problem: Metabolic/Fluid and Electrolytes - Adult  Goal: Electrolytes maintained within normal limits  Outcome: Adequate for Discharge  Goal: Hemodynamic stability and optimal renal function maintained  Outcome: Adequate for Discharge  Goal: Glucose maintained within prescribed range  Outcome:  Adequate for Discharge     Problem: Hematologic - Adult  Goal: Maintains hematologic stability  Outcome: Adequate for Discharge     Problem: Pain  Goal: Verbalizes/displays adequate comfort level or baseline comfort level  Outcome: Adequate for Discharge     Problem: Chronic Conditions and Co-morbidities  Goal: Patient's chronic conditions and co-morbidity symptoms are monitored and maintained or improved  Outcome: Adequate for Discharge

## 2024-02-10 NOTE — PLAN OF CARE
Problem: Discharge Planning  Goal: Discharge to home or other facility with appropriate resources  Outcome: Progressing     Problem: ABCDS Injury Assessment  Goal: Absence of physical injury  Outcome: Progressing     Problem: Safety - Adult  Goal: Free from fall injury  Outcome: Progressing     Problem: Respiratory - Adult  Goal: Achieves optimal ventilation and oxygenation  Outcome: Progressing     Problem: Cardiovascular - Adult  Goal: Maintains optimal cardiac output and hemodynamic stability  Outcome: Progressing

## 2024-02-10 NOTE — PROGRESS NOTES
Pulmonary/Critical Care Progress Note    CC:  Follow-up:  Acute hypoxemic and hypercapnic respiratory failure with SPO2 <90% on room air and CO2>60  ESTELLA  COPD  Systolic CHF, EF 15-20%  Fall  Tobacco abuse    Subjective:  Remains on 2L [place]  States his breathing feels better  Coughing up mucus  Wants to go home    ROS  + productive cough  No fever  No chills    Intake/Output Summary (Last 24 hours) at 2/10/2024 1014  Last data filed at 2/10/2024 0813  Gross per 24 hour   Intake 840 ml   Output 750 ml   Net 90 ml         PHYSICAL EXAM:  Blood pressure 122/64, pulse 94, temperature 97.8 °F (36.6 °C), temperature source Oral, resp. rate 16, height 1.727 m (5' 8\"), weight 121.5 kg (267 lb 13.7 oz), SpO2 92 %.'  Gen: No distress. Well developed, well-nourished  Eyes: No scleral icterus. No conjunctival injection.   ENT: External appearance of ears and nose normal.  Neck: Trachea midline. No obvious mass. No visible thyroid enlargement     Respiratory: No crackles. No wheezes. No rhonchi. No accessory muscle use. Diminished  Cardiovascular: Regular rate. Regular rhythm. No murmur or rub.  No edema  GI: Non-tender. Non-distended. No hernia. Bowel sounds present  Skin: Warm, dry, normal texture and turgor. No abnormalities on exposed extremities.   Musculoskeletal: No cyanosis. No clubbing. No joint deformity.    Neuro: Moves all four extremities.   Psychiatric:  Normal mood and affect; exhibits normal insight and judgement     Medications:    Scheduled Meds:   torsemide  20 mg Oral Daily    gabapentin  200 mg Oral TID    empagliflozin  10 mg Oral Daily    metoprolol succinate  50 mg Oral Daily    spironolactone  25 mg Oral Daily    potassium chloride  40 mEq Oral BID    apixaban  5 mg Oral BID    lisinopril  20 mg Oral Daily    mometasone-formoterol  2 puff Inhalation BID RT    citalopram  20 mg Oral Daily    ferrous sulfate  325 mg Oral BID WC    sodium chloride flush  5-40 mL IntraVENous 2 times per day    nicotine  1 patch  TransDERmal Daily       Continuous Infusions:   sodium chloride         PRN Meds:  ALPRAZolam, albuterol, ipratropium, albuterol sulfate HFA, sodium chloride flush, sodium chloride, potassium chloride **OR** potassium alternative oral replacement **OR** potassium chloride, magnesium sulfate, ondansetron **OR** ondansetron, polyethylene glycol, acetaminophen **OR** acetaminophen    Labs:  CBC:   Recent Labs     02/08/24  0535 02/09/24  0447 02/10/24  0454   WBC 8.0 7.4 8.1   HGB 14.5 14.4 14.4   HCT 43.7 42.7 43.2   MCV 84.6 84.8 85.2   * 139 134*     BMP:   Recent Labs     02/08/24  0535 02/09/24  0447 02/10/24  0454    135* 133*   K 4.8 5.1 4.8   CL 98* 94* 94*   CO2 32 34* 32   BUN 30* 30* 28*   CREATININE 1.4* 1.4* 1.2     LIVER PROFILE: No results for input(s): \"AST\", \"ALT\", \"LIPASE\", \"AMYLASE\", \"ALB\", \"BILIDIR\", \"BILITOT\", \"ALKPHOS\" in the last 72 hours.  PT/INR: No results for input(s): \"PROTIME\", \"INR\" in the last 72 hours.  APTT: No results for input(s): \"APTT\" in the last 72 hours.  UA:No results for input(s): \"NITRITE\", \"COLORU\", \"PHUR\", \"LABCAST\", \"WBCUA\", \"RBCUA\", \"MUCUS\", \"TRICHOMONAS\", \"YEAST\", \"BACTERIA\", \"CLARITYU\", \"SPECGRAV\", \"LEUKOCYTESUR\", \"UROBILINOGEN\", \"BILIRUBINUR\", \"BLOODU\", \"GLUCOSEU\", \"AMORPHOUS\" in the last 72 hours.    Invalid input(s): \"KETONESU\"  No results for input(s): \"PH\", \"PCO2\", \"PO2\" in the last 72 hours.    Films:  Chest imaging and associated reports were personally reviewed and showed CT chest 2/6  IMPRESSION:  Small bilateral pleural effusions with adjacent atelectasis.     Cardiomegaly with small-moderate pericardial effusion.     2.5 cm left adrenal lesion which has Hounsfield units of 8. This is  consistent with an adrenal adenoma.     Calcified gallstone versus sludge in the dependent gallbladder.     Enlargement and heterogeneity of the right lobe of the thyroid. Possible  surgical absence of the left lobe of the thyroid.  Nonemergent further  evaluation

## 2024-02-10 NOTE — PROGRESS NOTES
[facility]    Respiratory Therapy   Home Oxygen Evaluation        Name: Kaleb Mcclellan  Medical Record Number: [id number]  Age: 68 y.o.  Gender:  male   : 1955  Today's date: 2/10/2024  Room: R2D-6375/5250-01      Assessment        /64   Pulse 94   Temp 97.8 °F (36.6 °C) (Oral)   Resp 16   Ht 1.727 m (5' 8\")   Wt 121.5 kg (267 lb 13.7 oz)   SpO2 92%   BMI 40.73 kg/m²     Patient Active Problem List   Diagnosis    A-fib (HCC)    DM (diabetes mellitus) (HCC)    Arthritis    Smoker    Internal hemorrhoids    Hepatitis C    Spinal stenosis    DDD (degenerative disc disease)    Bone spur    Chest pain    Open facial wound    Acute on chronic systolic CHF (congestive heart failure) (HCC)    Acute on chronic congestive heart failure (HCC)    Hypokalemia       Social History:  Social History     Tobacco Use    Smoking status: Every Day     Current packs/day: 1.00     Average packs/day: 1 pack/day for 45.0 years (45.0 ttl pk-yrs)     Types: Cigarettes   Substance Use Topics    Alcohol use: Yes     Comment: 1 pint/day    Drug use: Yes     Types: Marijuana ([place])       Patient Room Air saturation at rest 91  %  Patient Room Air saturation upon ambulation 97 %  Patient ambulated 4 minutes.  (Minimum of 3 minutes unless Sp02 < 88% prior to 3 minute [place])    Oxygen saturations of 88% or less on RA qualifies patient for Home Oxygen    Patient resting on 0  lmp  with an oxygen saturation of  91 %     Patient ambulated on 0 lpm with an oxygen saturation of 97%        In your clinical assessment does the Patient Require Portable Oxygen Tanks?    No: No           Patient's RN notified.     Patient/caregiver was educated on Home Oxygen process:  Yes      Level of patient/caregiver understanding able to:   [x] Verbalize understanding   [x] Demonstrate understanding       [] Teach back        [] Needs reinforcement        []  No available caregiver               []  Other:     Response to

## 2024-02-10 NOTE — CARE COORDINATION
DISCHARGE SUMMARY     DATE OF DISCHARGE: 2/10/24    DISCHARGE DESTINATION: home to girlfriend house at [address] [facility] [address]     HOME CARE AGENCY: Discharging to Facility/ Agency   Name: [facility]  Address:   [address] [place], [place] [id number]  Phone:  [phone]  Fax:  [phone]    DME ORDERED: walker- delivered by CM from [employer]    TRANSPORTATION: family/friend transport    COMMENTS: revd notes, pt needs a walker and home care at [place].  Provided a walker from [employer] and called and LM for Audrey at [facility] that pt is dc today.  Pt has already been accepted by [facility]  Electronically signed by PETEY Levi on 2/10/2024 at 3:37 PM

## 2024-02-14 ENCOUNTER — FOLLOWUP TELEPHONE ENCOUNTER (OUTPATIENT)
Dept: ADMINISTRATIVE | Age: 69
End: 2024-02-14

## 2024-02-14 NOTE — PROGRESS NOTES
Late entry from 2/13/24 at 1730:    This HF RN made x3 attempts throughout the day to reach pt for a 72 hour heart failure hospital follow up call. I was not able to reach pt but was able to leave a secure message. I identified myself and gave the reason for my call, left phone number, and reminded pt of his upcoming Cardiology f/u appt with Lula Brand NP 2/15 at 3:00pm Appt was on his AVS at time of dc.

## 2024-02-16 ENCOUNTER — OFFICE VISIT (OUTPATIENT)
Dept: CARDIOLOGY CLINIC | Age: 69
End: 2024-02-16
Payer: MEDICARE

## 2024-02-16 VITALS
OXYGEN SATURATION: 95 % | HEIGHT: 68 IN | HEART RATE: 90 BPM | WEIGHT: 267 LBS | BODY MASS INDEX: 40.47 KG/M2 | SYSTOLIC BLOOD PRESSURE: 128 MMHG | DIASTOLIC BLOOD PRESSURE: 74 MMHG

## 2024-02-16 DIAGNOSIS — I10 PRIMARY HYPERTENSION: ICD-10-CM

## 2024-02-16 DIAGNOSIS — I50.22 CHRONIC SYSTOLIC CONGESTIVE HEART FAILURE (HCC): Primary | ICD-10-CM

## 2024-02-16 DIAGNOSIS — Z72.0 TOBACCO ABUSE: ICD-10-CM

## 2024-02-16 DIAGNOSIS — I48.0 PAROXYSMAL ATRIAL FIBRILLATION (HCC): ICD-10-CM

## 2024-02-16 PROCEDURE — 3074F SYST BP LT 130 MM HG: CPT | Performed by: NURSE PRACTITIONER

## 2024-02-16 PROCEDURE — 99214 OFFICE O/P EST MOD 30 MIN: CPT | Performed by: NURSE PRACTITIONER

## 2024-02-16 PROCEDURE — 3078F DIAST BP <80 MM HG: CPT | Performed by: NURSE PRACTITIONER

## 2024-02-16 PROCEDURE — 1123F ACP DISCUSS/DSCN MKR DOCD: CPT | Performed by: NURSE PRACTITIONER

## 2024-02-16 NOTE — DISCHARGE SUMMARY
spironolactone 25 MG tablet  Commonly known as: ALDACTONE  Take 1 tablet by mouth daily     torsemide 20 MG tablet  Commonly known as: DEMADEX  Take 1 tablet by mouth daily            CHANGE how you take these medications      gabapentin 100 MG capsule  Commonly known as: NEURONTIN  Take 2 capsules by mouth 3 times daily for 30 days.  What changed:   medication strength  how much to take  how to take this  when to take this  additional instructions  Another medication with the same name was removed. Continue taking this medication, and follow the directions you see here.     lisinopril 20 MG tablet  Commonly known as: PRINIVIL;ZESTRIL  Take 1 tablet by mouth daily  What changed:   medication strength  how much to take  how to take this  when to take this  additional instructions            CONTINUE taking these medications      albuterol sulfate  (90 Base) MCG/ACT inhaler  Commonly known as: Proventil HFA  Inhale 2 puffs into the lungs every 6 hours as needed for Wheezing.     ALPRAZolam 1 MG tablet  Commonly known as: XANAX     apixaban 5 MG Tabs tablet  Commonly known as: ELIQUIS     citalopram 20 MG tablet  Commonly known as: CeleXA  Take 1 tablet by mouth daily     Trelegy Ellipta 100-62.5-25 MCG/ACT Aepb inhaler  Generic drug: fluticasone-umeclidin-vilant            STOP taking these medications      amLODIPine 10 MG tablet  Commonly known as: NORVASC     carvedilol 25 MG tablet  Commonly known as: Coreg               Where to Get Your Medications        These medications were sent to [address] -  [phone] - F [phone]  [address] [place] [address]      Phone: [phone]   empagliflozin 10 MG tablet  gabapentin 100 MG capsule  lisinopril 20 MG tablet  metoprolol succinate 50 MG extended release tablet  spironolactone 25 MG tablet  torsemide 20 MG tablet        Objective Findings at Discharge:   BP (!) 143/80   Pulse (!) 102   Temp

## 2024-02-16 NOTE — PROGRESS NOTES
[facility]   Cardiology Office Visit      Date: 2/16/2024  CC:    Chief Complaint   Patient presents with    Follow-Up from Hospital     I went home Saturday.  No weight gain, SOB or swelling.       HPI:   I had the privilege of visiting Kaleb Mcclellan in the office.   Kaleb Mcclellan is a 68 y.o. male  Presents today for follow up :after recent hospitalization 2/3/24 for CHF.  Patient is a 68 yr old male who presented to ED with worsening shortness of breath. He had stopped taking all of his medications.  In the ED he was found to be in AF with RVR with HR in the 150's. He was also found to combined systolic and diastolic heart failure with EF of 15-20%    Today he presents for follow-up. Patient is overall pleasant however reports   \"No one told me shit about me heart\"     The exam room smells like marijuana and patient reports he continues to smoke tobacco and marijuana on a regular basis. He reports he has been taking his medications.  He has not been following fluid or Na restrictions. No daily wt.     Discharge wt 266 lb  Current wt 267 lb     Patient denies lightheadedness, dizziness, shortness of breath, chest pain, palpitations, orthopnea, edema, presyncope or syncope.         Past Medical History:   Diagnosis Date    A-fib (HCC)     irreg heartbeat    Arthritis     Bone spur     l ankle    DDD (degenerative disc disease)     DM (diabetes mellitus) (HCC)     ETOH abuse     recovering    Hepatitis C     HTN (hypertension)     Internal hemorrhoids     OA (osteoarthritis)     Sleep apnea     Smoker     ippd    Spinal stenosis         No past surgical history on file.    Allergies:  Allergies   Allergen Reactions    Codeine        Medication:  Current Outpatient Medications   Medication Sig Dispense Refill    gabapentin (NEURONTIN) 100 MG capsule Take 2 capsules by mouth 3 times daily for 30 days. 90 capsule 0    spironolactone (ALDACTONE) 25 MG tablet Take 1 tablet by mouth daily 30 tablet 3

## 2024-02-16 NOTE — PATIENT INSTRUCTIONS
No changes to medications    [facility] with [facility]    They will help you organize your medications    Daily weight

## 2024-02-23 ENCOUNTER — TELEPHONE (OUTPATIENT)
Dept: PHARMACY | Age: 69
End: 2024-02-23

## 2024-02-23 NOTE — TELEPHONE ENCOUNTER
New referral for Heart Failure Services.    Reached out to schedule the initial appointment.     Spoke with patient. Scheduled for 3/1/24 in coordination with Dr. Keerthi Land, PharmD  [facility]  [facility]  Heart Failure Service  [phone]    For Pharmacy Admin Tracking Only    Program: Medication Management  CPA in place:  Yes  Recommendation Provided To:   Intervention Detail:   Intervention Accepted By:   Gap Closed?:    Time Spent (min): 10

## 2024-02-26 PROBLEM — I50.22 CHRONIC SYSTOLIC CONGESTIVE HEART FAILURE (HCC): Status: ACTIVE | Noted: 2024-02-03

## 2024-02-26 PROBLEM — I10 PRIMARY HYPERTENSION: Status: ACTIVE | Noted: 2024-02-26

## 2024-02-26 ASSESSMENT — ENCOUNTER SYMPTOMS
WHEEZING: 0
SHORTNESS OF BREATH: 0
CHEST TIGHTNESS: 0
COUGH: 0
APNEA: 0

## 2024-02-29 NOTE — PROGRESS NOTES
[facility]  Cardiology Note      Kaleb Mcclellan  1955, 68 y.o.          CC: Heart failure New patient (seen by Cathie)             Enio Davalos MD:      HPI:   This is a 68 y.o. male Patient is a 68 yr old male who presented to ED with worsening shortness of breath. He had stopped taking all of his medications. In the ED he was found to be in AF with RVR with HR in the 150's. He was also found to combined systolic and diastolic heart failure with EF of 15-20%. Patient reports he continues to smoke tobacco and marijuana on a regular basis. He reports he has been taking his medications. He has not been following fluid or Na restrictions. No daily wt.     The patient was treated with IV Lasix, gabapentin dose was reduced to 200 3 times a day.    He comes for a follow-up visit and says he is doing much better.  He consents to compliant with his medicines.  He told me that he was not taking Jardiance because for some reason was not prescribed.  We also considered about ICD since he appears to have chronic LV dysfunction.  However decided against it because of compliance issues.  The plan is to only offer him if he is compliant with follow-ups and medications        Past Medical History:   Diagnosis Date    A-fib (HCC)     irreg heartbeat    Arthritis     Bone spur     l ankle    DDD (degenerative disc disease)     DM (diabetes mellitus) (HCC)     ETOH abuse     recovering    Hepatitis C     HTN (hypertension)     Internal hemorrhoids     OA (osteoarthritis)     Sleep apnea     Smoker     ippd    Spinal stenosis       No past surgical history on file.   No family history on file.   Social History     Tobacco Use    Smoking status: Every Day     Current packs/day: 1.00     Average packs/day: 1 pack/day for 45.0 years (45.0 ttl pk-yrs)     Types: Cigarettes   Substance Use Topics    Alcohol use: Yes     Comment: 1 pint/day    Drug use: Yes     Types: Marijuana ([place])     Allergies   Allergen Reactions

## 2024-03-01 ENCOUNTER — TELEPHONE (OUTPATIENT)
Dept: PHARMACY | Age: 69
End: 2024-03-01

## 2024-03-01 ENCOUNTER — OFFICE VISIT (OUTPATIENT)
Dept: CARDIOLOGY CLINIC | Age: 69
End: 2024-03-01
Payer: MEDICARE

## 2024-03-01 VITALS
HEART RATE: 50 BPM | OXYGEN SATURATION: 92 % | BODY MASS INDEX: 40.21 KG/M2 | SYSTOLIC BLOOD PRESSURE: 124 MMHG | WEIGHT: 264.4 LBS | DIASTOLIC BLOOD PRESSURE: 70 MMHG

## 2024-03-01 DIAGNOSIS — I50.22 CHRONIC SYSTOLIC CONGESTIVE HEART FAILURE (HCC): ICD-10-CM

## 2024-03-01 DIAGNOSIS — I50.22 CHRONIC SYSTOLIC CONGESTIVE HEART FAILURE (HCC): Primary | ICD-10-CM

## 2024-03-01 LAB
ALBUMIN SERPL-MCNC: 4.3 G/DL (ref 3.4–5)
ALBUMIN/GLOB SERPL: 1.3 {RATIO} (ref 1.1–2.2)
ALP SERPL-CCNC: 167 U/L (ref 40–129)
ALT SERPL-CCNC: 11 U/L (ref 10–40)
ANION GAP SERPL CALCULATED.3IONS-SCNC: 17 MMOL/L (ref 3–16)
AST SERPL-CCNC: 14 U/L (ref 15–37)
BILIRUB SERPL-MCNC: 0.6 MG/DL (ref 0–1)
BUN SERPL-MCNC: 14 MG/DL (ref 7–20)
CALCIUM SERPL-MCNC: 10.2 MG/DL (ref 8.3–10.6)
CHLORIDE SERPL-SCNC: 95 MMOL/L (ref 99–110)
CO2 SERPL-SCNC: 28 MMOL/L (ref 21–32)
CREAT SERPL-MCNC: 1.2 MG/DL (ref 0.8–1.3)
GFR SERPLBLD CREATININE-BSD FMLA CKD-EPI: >60 ML/MIN/{1.73_M2}
GLUCOSE SERPL-MCNC: 107 MG/DL (ref 70–99)
NT-PROBNP SERPL-MCNC: 1168 PG/ML (ref 0–124)
POTASSIUM SERPL-SCNC: 4.3 MMOL/L (ref 3.5–5.1)
PROT SERPL-MCNC: 7.7 G/DL (ref 6.4–8.2)
SODIUM SERPL-SCNC: 140 MMOL/L (ref 136–145)

## 2024-03-01 PROCEDURE — 1123F ACP DISCUSS/DSCN MKR DOCD: CPT | Performed by: INTERNAL MEDICINE

## 2024-03-01 PROCEDURE — 99214 OFFICE O/P EST MOD 30 MIN: CPT | Performed by: INTERNAL MEDICINE

## 2024-03-01 PROCEDURE — 3074F SYST BP LT 130 MM HG: CPT | Performed by: INTERNAL MEDICINE

## 2024-03-01 PROCEDURE — 3078F DIAST BP <80 MM HG: CPT | Performed by: INTERNAL MEDICINE

## 2024-03-01 NOTE — TELEPHONE ENCOUNTER
Kaleb was to come to see us today for heart failure services.  He got kept over at his [facility] visit.  Called to reschedule.  Friday appointments work best.  Reports he is waiting on a refill as it is the end of the month.  Just getting medications figured out. Rescheduled to 3/8/24.     Sarah Land, PharmD  [facility]  [facility]  Heart Failure Service  [phone]    For Pharmacy Admin Tracking Only    Program: Medication Management  CPA in place:  Yes  Recommendation Provided To:   Intervention Detail:   Intervention Accepted By:   Gap Closed?:    Time Spent (min): 5

## 2024-03-01 NOTE — PATIENT INSTRUCTIONS
If gets short of breath or wheezy can take an extra dose of torsemide in the afternoon    Start Jardiance 1 tablet daily if approved by insurance  
drawn today 7/26/unknown
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
## 2024-03-12 ENCOUNTER — OFFICE VISIT (OUTPATIENT)
Dept: PHARMACY | Age: 69
End: 2024-03-12
Payer: MEDICARE

## 2024-03-12 ENCOUNTER — ANTI-COAG VISIT (OUTPATIENT)
Dept: PHARMACY | Age: 69
End: 2024-03-12

## 2024-03-12 VITALS
SYSTOLIC BLOOD PRESSURE: 99 MMHG | OXYGEN SATURATION: 95 % | DIASTOLIC BLOOD PRESSURE: 62 MMHG | HEART RATE: 79 BPM | BODY MASS INDEX: 40.94 KG/M2 | WEIGHT: 269.2 LBS

## 2024-03-12 DIAGNOSIS — I50.22 CHRONIC SYSTOLIC CONGESTIVE HEART FAILURE (HCC): Primary | ICD-10-CM

## 2024-03-12 PROCEDURE — 99214 OFFICE O/P EST MOD 30 MIN: CPT

## 2024-03-12 RX ORDER — GABAPENTIN 100 MG/1
200 CAPSULE ORAL 3 TIMES DAILY
COMMUNITY

## 2024-03-12 NOTE — PATIENT INSTRUCTIONS
Call your doctor for a refill on your albuterol inhaler  Ask your doctor to reorder nicotine patches  I would have Dr. Davalos check your A1C to see how you are doing with regards to your diabetes    HEART FAILURE:    Follow up with your Cardiologist, Primary Care Physician and other physicians as instructed. Especially 7 days after a hospitalization.     Call right away with any signs or symptoms of heart failure or other medical conditions that need attention or with any questions at all. Call your Cardiologist, Primary Care Physician or [facility] ([phone].) We can help and often prevent an Emergency Room visit or hospitalization.     Know your dry weight (your weight without any extra fluid on board)    It is very important to take your medications as prescribed, do not miss any doses.  Call for refills before you run out. Typically when you have one week left.  If you have trouble getting your medications, call us at [phone].    Avoid NSAIDs (non steroidal anti inflammatory drugs) like Aleve (naproxen), Advil and Motrin (ibuprofen), Mobic (meloxicam), Voltaren (diclofenac), Celebrex (celecoxib) and aspirin. A daily aspirin is okay if recommended by your physician.    It is okay to take Tylenol (acetaminophen) unless your physician has told you otherwise.    Limit fluid intake.   Typically no more than 2,000 milliliters (mL) per day.   Equal to 2 liters. Equal to approximately 64 ounces (oz) per day  Follow physician instructions if different than above    Limit sodium intake.  Typically no more than 2,000 milligrams (mg) per day.  Add up the sodium on the nutrition label for what you eat and drink each day.  Follow physician instructions if different than above    Weigh yourself every day. Weigh before breakfast and after you go to the restroom. Wear the same thing each time. Keep a log and bring it to each visit.  Call your heart doctor or the [facility] at

## 2024-03-12 NOTE — PROGRESS NOTES
inpatient.   Plans to reach out to PCP for RX for patches.       Continue Heart Failure Medications as prescribed    Daily weights  Call with weight gain of 3 pounds per day or 5 pounds per week  Call with weight above dry weight  Limit sodium to 2000mg per day and limit fluids to 2000mL per day   Call right away with si/sx of heart failure  Avoid NSAIDs  Activity goal of 30 minutes 5 times a week  Heart Healthy Eating Patterns  Remain up to date with vaccinations  Avoid Alcohol and Smoking Cessation    Follow Cardiology instructions.   Appears to be following instructions  Follow up with health care providers. Providers that need to be scheduled:   None identified  Could benefit from a sleep center f/u. Used a device for ESTELLA in the past. Not using at this time.     [facility] Follow up:   2 weeks          No orders of the defined types were placed in this encounter.    No orders of the defined types were placed in this encounter.      Billing Points:  Complex education (new pt, bridging instructions, specific dietary instruction) - 3 points  Adjust dosage and/or reconcile meds (fill pill box) >/= 11 medications - 6 points  BASIC ASSESSMENT UNCOMPLICATED (including but not limited to: vital signs; physical assessment screening; review of secondary markers like lab results, allergies, home readings; instructions on treatment plan and basic education; assessment of medication list with one med change and compliance) - 2 points    Electronically signed by Sarah Land RPH, PharmD on 3/12/2024 at 3:06 PM     For Pharmacy Admin Tracking Only    Program: Medication Management  CPA in place:  Yes  Recommendation Provided To: Patient/Caregiver: 6 via In person  Intervention Detail: Adherence Monitorin  Intervention Accepted By: Patient/Caregiver: 6  Gap Closed?: Yes   Time Spent (min):  75

## 2024-03-27 ENCOUNTER — OFFICE VISIT (OUTPATIENT)
Dept: PHARMACY | Age: 69
End: 2024-03-27
Payer: MEDICARE

## 2024-03-27 VITALS
BODY MASS INDEX: 40.94 KG/M2 | DIASTOLIC BLOOD PRESSURE: 68 MMHG | OXYGEN SATURATION: 94 % | WEIGHT: 269.2 LBS | SYSTOLIC BLOOD PRESSURE: 108 MMHG | HEART RATE: 78 BPM

## 2024-03-27 DIAGNOSIS — I50.22 CHRONIC SYSTOLIC CONGESTIVE HEART FAILURE (HCC): Primary | ICD-10-CM

## 2024-03-27 PROCEDURE — 99214 OFFICE O/P EST MOD 30 MIN: CPT

## 2024-03-27 NOTE — PATIENT INSTRUCTIONS
Consider a cane to help prevent falls  Work on improving your sleep habits. Follow the health sleep habit handout.   Consider a sleep study.  Will likely be preferred by your heart doctor.   Continue to work to decrease smoking and work to quit smoking.   Consider calling 2-797-QUIT NOW.  They may be able to help you get nicotine replacement like patches or gum.   I would have Dr. Davalos check your A1C to see how you are doing with regards to your diabetes  Weigh yourself daily with the new scale provided.  Call the heart doctor at [phone] if you gain 3 pounds in a day or 5 pounds in week.   Limit fluid to no more than 2 liters per day (64 pounces)  Limit sodium to mo more than 2000mg per day. Read the label and choose low sodium.

## 2024-03-27 NOTE — PROGRESS NOTES
take:  gabapentin (NEURONTIN)   lisinopril (PRINIVIL;ZESTRIL)    STOP taking:  amLODIPine 10 MG tablet (NORVASC)   carvedilol 25 MG tablet (Coreg)       Kaleb Mcclellan appears well, in no acute distress, and with no significant weight gain. Comes with no ambulatory device assistance. Is here today alone for further heart failure education and assessment. Kaleb appears ready, willing and able to engage in self-management activities.     Dyspnea on exertion  Dyspnea with normal activities  Just sometimes has dyspnea with normal activities.     Denies lower extremity edema  Denies fluid weight gain  Denies unusual fatigue  Denies early saiety or abdominal fullness     Occasionally sleeps during the day and up all night.       Kaleb's goals pertaining to heart failure and over all health care:   - Short term goal: new place to live, first floor apartment. Fall risk with stairs  - Long term goal: stay alive    Objective     Patient Active Problem List   Diagnosis Code    A-fib (HCC) I48.91    DM (diabetes mellitus) (HCC) E11.9    Arthritis M19.90    Tobacco abuse Z72.0    Internal hemorrhoids K64.8    Hepatitis C B19.20    Spinal stenosis M48.00    DDD (degenerative disc disease) [id number]    Bone spur M77.9    Chest pain R07.9    Open facial wound S01.80XA    Chronic systolic congestive heart failure (HCC) I50.22    Acute on chronic congestive heart failure (HCC) I50.9    Hypokalemia E87.6    Acute respiratory failure with hypoxia and hypercapnia (HCC) J96.01, J96.02    Chronic obstructive pulmonary disease (HCC) J44.9    ESTELLA (obstructive sleep apnea) G47.33    Primary hypertension I10     Social History     Tobacco Use    Smoking status: Every Day     Current packs/day: 1.00     Average packs/day: 1 pack/day for 45.0 years (45.0 ttl pk-yrs)     Types: Cigarettes   Substance Use Topics    Alcohol use: Yes     Comment: 1 pint/day    Drug use: Yes     Types: Marijuana (Weed)      BMP:   Lab Results   Component Value

## 2024-04-18 NOTE — PROGRESS NOTES
[facility]  Cardiology Note      Kaleb Mcclellan  1955, 68 y.o.        CC: Heart failure, Afib with RVR          Enio Davalos MD:      HPI:   This is a 68 y.o. male Patient is a 68 yr old male who presented to ED with worsening shortness of breath. He had stopped taking all of his medications. In the ED he was found to be in AF with RVR with HR in the 150's. He was also found to combined systolic and diastolic heart failure with EF of 15-20%. Patient reports he continues to smoke tobacco and marijuana on a regular basis. He reports he has been taking his medications. He has not been following fluid or Na restrictions. No daily wt.     The patient was treated with IV Lasix, gabapentin dose was reduced to 200 3 times a day.    He comes for a follow-up visit and says he is doing much better.  He is compliant with his medicines.  Breathing is much better    The patient is on Eliquis 5 gabapentin 100 Jardiance 10 lisinopril 20 Toprol 50 torsemide 20 spironolactone 25    Past Medical History:   Diagnosis Date    A-fib (HCC)     irreg heartbeat    Arthritis     Bone spur     l ankle    DDD (degenerative disc disease)     DM (diabetes mellitus) (HCC)     ETOH abuse     recovering    Hepatitis C     HTN (hypertension)     Internal hemorrhoids     OA (osteoarthritis)     Sleep apnea     Smoker     ippd    Spinal stenosis       No past surgical history on file.   No family history on file.   Social History     Tobacco Use    Smoking status: Every Day     Current packs/day: 1.00     Average packs/day: 1 pack/day for 45.0 years (45.0 ttl pk-yrs)     Types: Cigarettes   Substance Use Topics    Alcohol use: Yes     Comment: 1 pint/day    Drug use: Yes     Types: Marijuana (Weed)     Allergies   Allergen Reactions    Codeine        Review of Systems -   Constitutional: Negative for weight gain/loss; malaise, fever  Respiratory: Negative for Asthma;  cough and hemoptysis  Cardiovascular: Negative for

## 2024-04-19 ENCOUNTER — OFFICE VISIT (OUTPATIENT)
Dept: CARDIOLOGY CLINIC | Age: 69
End: 2024-04-19
Payer: MEDICARE

## 2024-04-19 VITALS
HEIGHT: 68 IN | BODY MASS INDEX: 41.07 KG/M2 | DIASTOLIC BLOOD PRESSURE: 74 MMHG | HEART RATE: 58 BPM | WEIGHT: 271 LBS | SYSTOLIC BLOOD PRESSURE: 110 MMHG | OXYGEN SATURATION: 94 %

## 2024-04-19 DIAGNOSIS — I42.9 CARDIOMYOPATHY, UNSPECIFIED TYPE (HCC): Primary | ICD-10-CM

## 2024-04-19 DIAGNOSIS — I42.9 CARDIOMYOPATHY, UNSPECIFIED TYPE (HCC): ICD-10-CM

## 2024-04-19 PROCEDURE — 3074F SYST BP LT 130 MM HG: CPT | Performed by: INTERNAL MEDICINE

## 2024-04-19 PROCEDURE — 3078F DIAST BP <80 MM HG: CPT | Performed by: INTERNAL MEDICINE

## 2024-04-19 PROCEDURE — 1123F ACP DISCUSS/DSCN MKR DOCD: CPT | Performed by: INTERNAL MEDICINE

## 2024-04-19 PROCEDURE — 99214 OFFICE O/P EST MOD 30 MIN: CPT | Performed by: INTERNAL MEDICINE

## 2024-04-19 RX ORDER — TORSEMIDE 20 MG/1
20 TABLET ORAL DAILY
Qty: 30 TABLET | Refills: 5 | Status: SHIPPED | OUTPATIENT
Start: 2024-04-19

## 2024-04-19 RX ORDER — METOPROLOL SUCCINATE 50 MG/1
50 TABLET, EXTENDED RELEASE ORAL DAILY
Qty: 90 TABLET | Refills: 5 | Status: SHIPPED | OUTPATIENT
Start: 2024-04-19

## 2024-04-19 RX ORDER — LISINOPRIL 20 MG/1
20 TABLET ORAL DAILY
Qty: 90 TABLET | Refills: 5 | Status: SHIPPED | OUTPATIENT
Start: 2024-04-19

## 2024-04-19 RX ORDER — SPIRONOLACTONE 25 MG/1
25 TABLET ORAL DAILY
Qty: 90 TABLET | Refills: 5 | Status: SHIPPED | OUTPATIENT
Start: 2024-04-19

## 2024-04-20 LAB
ALBUMIN SERPL-MCNC: 4.2 G/DL (ref 3.4–5)
ALBUMIN/GLOB SERPL: 1.4 {RATIO} (ref 1.1–2.2)
ALP SERPL-CCNC: 161 U/L (ref 40–129)
ALT SERPL-CCNC: 10 U/L (ref 10–40)
ANION GAP SERPL CALCULATED.3IONS-SCNC: 13 MMOL/L (ref 3–16)
AST SERPL-CCNC: 14 U/L (ref 15–37)
BILIRUB SERPL-MCNC: 0.6 MG/DL (ref 0–1)
BUN SERPL-MCNC: 24 MG/DL (ref 7–20)
CALCIUM SERPL-MCNC: 10.4 MG/DL (ref 8.3–10.6)
CHLORIDE SERPL-SCNC: 98 MMOL/L (ref 99–110)
CHOLEST SERPL-MCNC: 157 MG/DL (ref 0–199)
CO2 SERPL-SCNC: 30 MMOL/L (ref 21–32)
CREAT SERPL-MCNC: 1.5 MG/DL (ref 0.8–1.3)
GFR SERPLBLD CREATININE-BSD FMLA CKD-EPI: 50 ML/MIN/{1.73_M2}
GLUCOSE SERPL-MCNC: 125 MG/DL (ref 70–99)
HDLC SERPL-MCNC: 45 MG/DL (ref 40–60)
LDLC SERPL CALC-MCNC: 93 MG/DL
POTASSIUM SERPL-SCNC: 4 MMOL/L (ref 3.5–5.1)
PROT SERPL-MCNC: 7.3 G/DL (ref 6.4–8.2)
SODIUM SERPL-SCNC: 141 MMOL/L (ref 136–145)
TRIGL SERPL-MCNC: 94 MG/DL (ref 0–150)
TSH SERPL DL<=0.005 MIU/L-ACNC: 0.61 UIU/ML (ref 0.27–4.2)
VLDLC SERPL CALC-MCNC: 19 MG/DL

## 2024-05-08 ENCOUNTER — APPOINTMENT (OUTPATIENT)
Dept: PHARMACY | Age: 69
End: 2024-05-08
Payer: MEDICARE

## 2024-05-16 ENCOUNTER — OFFICE VISIT (OUTPATIENT)
Dept: PHARMACY | Age: 69
End: 2024-05-16
Payer: MEDICARE

## 2024-05-16 PROCEDURE — 99214 OFFICE O/P EST MOD 30 MIN: CPT

## 2024-05-16 NOTE — PROGRESS NOTES
if snacks are available. Not usually as he lives with 4 other guys.   Nephew cooks for him.   Counseled on co-morbidities affecting heart failure:  HTN, Diabetes, Atrial Fibrillation / Flutter, ESTELLA, COPD, and Obesity  BP control, DM control, Heart healthy eating patterns, Low sodium eating patterns, Smoking cessation, Taking medications as prescribed, and Proper follow up  Talked mostly about ESTELLA and healthy sleeping patterns. Encouraged sleep appointment f/u. Not using a CPAP. Previously caused trouble breathing. Didn't fit well.  Advised better options are available.     Materials Given:   Hospital drinking cup with measurements on the side    Reviewed the patient instructions from [facility]'s most recent OV with Mr. Mcclellan today and identified any concerns.      Assessed need for follow up including but not limited to Dietitian, Spiritual Care, , Diabetes, COPD, Financial Assistance, Cardiac Rehab, Pulmonary Rehab, Smoking Cessation, Sleep Medicine, Palliative Care.    Discussed consequences of non adherence to health care plan.    Plan     - Patient reports no new SOB symptoms. He is adherent to all medications and reports no missed doses.  - Only weighs himself twice a week and does not keep record of weights. Offered a weight log but he declined. Reminded him of the importance of weighing himself daily in the morning.  - Reports smoking 1 PPD (cigarettes w/o filters) as well as marijuana.   - Sent Rx to [facility] retail for nicotine patches  - Does not have BP cuff at home, so is not monitoring daily. Said marijuana was the \"best BP medicine\". Counseled on why checking BP daily is important.  - Due to sleep apnea, he only sleeps few hours per night. Does not attribute any sleeping difficulties to heart failure.  - Reports he may be consuming more liquid than 2L daily limit. Gave him a hospital cup with measurements on the side so he can keep track o how many mLs he's drinking.    Seems  identified  Could benefit from a sleep center f/u. Used a device for ESTELLA in the past. Not using at this time.     [facility] Follow up:   1 month       No orders of the defined types were placed in this encounter.    No orders of the defined types were placed in this encounter.      Electronically signed by Vincenzo Kruger, PharmD Candidate on 2024 at 2:20 PM    Billing Points:    Complex education (new pt, bridging instructions, specific dietary instruction) - 3 points  Adjust dosage and/or reconcile meds (fill pill box) >/= 11 medications - 6 points  BASIC ASSESSMENT UNCOMPLICATED (including but not limited to: vital signs; physical assessment screening; review of secondary markers like lab results, allergies, home readings; instructions on treatment plan and basic education; assessment of medication list with one med change and compliance) - 2 points    For Pharmacy Admin Tracking Only    Program: Medication Management  CPA in place:  Yes  Recommendation Provided To: Patient/Caregiver: 6 via In person  Intervention Detail: Adherence Monitorin  Intervention Accepted By: Patient/Caregiver: 6  Gap Closed?: Yes   Time Spent (min): 45

## 2024-05-16 NOTE — PATIENT INSTRUCTIONS
Patient instructions:   Consider a cane to help prevent falls  Work on improving your sleep habits. Follow the health sleep habit handout.   Consider a sleep study.  Will likely be preferred by your heart doctor.   Continue to work to decrease smoking and work to quit smoking.   Consider calling 7-889-QUIT NOW.  They may be able to help you get nicotine replacement like patches or gum.   I would have Dr. Davalos check your A1C to see how you are doing with regards to your diabetes  Weigh yourself daily with the new scale provided.  Call the heart doctor at [phone] if you gain 3 pounds in a day or 5 pounds in week.   Limit fluid to no more than 2 liters per day (64 pounces)  Limit sodium to mo more than 2000mg per day. Read the label and choose low sodium.

## 2024-06-13 ENCOUNTER — OFFICE VISIT (OUTPATIENT)
Dept: PHARMACY | Age: 69
End: 2024-06-13
Payer: MEDICARE

## 2024-06-13 VITALS
OXYGEN SATURATION: 96 % | DIASTOLIC BLOOD PRESSURE: 72 MMHG | HEART RATE: 80 BPM | SYSTOLIC BLOOD PRESSURE: 120 MMHG | WEIGHT: 274.2 LBS | BODY MASS INDEX: 41.7 KG/M2

## 2024-06-13 DIAGNOSIS — I50.22 CHRONIC SYSTOLIC CONGESTIVE HEART FAILURE (HCC): Primary | ICD-10-CM

## 2024-06-13 PROCEDURE — 99214 OFFICE O/P EST MOD 30 MIN: CPT

## 2024-06-13 NOTE — PATIENT INSTRUCTIONS
Reach out to  about living arrangements. Sarah Howell [phone].   Call and reschedule an appointment with Dr. Davalos  See if Dr. Pendleton will refer you to a lung doctor (Pulmonologist)  Consider a cane to help prevent falls  Work on improving your sleep habits. Follow the health sleep habit handout.   Consider a sleep study.  Will likely be preferred by your heart doctor.   Continue to work to decrease smoking and work to quit smoking. Continue to use the nicotine patches.   Consider calling 7-433-QUIT NOW.    I would have Dr. Davalos check your A1C to see how you are doing with regards to your diabetes  Weigh yourself daily with the new scale provided.  Call the heart doctor at [phone] if you gain 3 pounds in a day or 5 pounds in week.   Limit fluid to no more than 2 liters per day (64 pounces)  Limit sodium to mo more than 2000mg per day. Read the label and choose low sodium.

## 2024-06-13 NOTE — PROGRESS NOTES
One story. He reports I need a  to help.  His home RN was to be helping, but he hasn't heard.  Gave him our social workers number.     Interventions:  Resolved Heart Failure Medication Discrepancies:  none    - No other interventions today    Communication to other providers:  None at this time.     Patient instructions:   Reach out to  about living arrangements. Sarah Howell [phone].   Call and reschedule an appointment with Dr. Davalos  See if Dr. Pendleton will refer you to a lung doctor (Pulmonologist)  Consider a cane to help prevent falls  Work on improving your sleep habits. Follow the health sleep habit handout.   Consider a sleep study.  Will likely be preferred by your heart doctor.   Continue to work to decrease smoking and work to quit smoking. Continue to use the nicotine patches.   Consider calling [phone]-QUIT NOW.    I would have Dr. Davalos check your A1C to see how you are doing with regards to your diabetes  Weigh yourself daily with the new scale provided.  Call the heart doctor at [phone] if you gain 3 pounds in a day or 5 pounds in week.   Limit fluid to no more than 2 liters per day (64 pounces)  Limit sodium to mo more than 2000mg per day. Read the label and choose low sodium.     - Provider Lyft Ride home    Smoking Cessation Plan:  Advised to Quit  Provided Assistance: Counseled on Nicotine Replacement Products / Medications  Will Follow Up Next Visit  He is interested in quitting, did well with patches while inpatient.   Patches covered by his secondary insurance.   Advised to call 4-149-QUIT NOW for assistance. He is agreeable.   Used patches, stopped and now going back to patches.   < 1ppd  Decreased, too expensive      Continue Heart Failure Medications as prescribed    Daily weights  Call with weight gain of 3 pounds per day or 5 pounds per week  Call with weight above dry weight  Limit sodium to 2000mg per day and limit fluids to 2000mL per day   Call right

## 2024-06-26 NOTE — PROGRESS NOTES
for palpitations,dizziness   Gastrointestinal: Negative for abd.pain; constipation/diarrhea;    Genitourinary: Negative for stones; hematuria; frequency hesitancy  Integumentt: Negative for rash or pruritis  Hematologic/lymphatic: Negative for blood dyscrasia; leukemia/lymphoma  Musculoskeletal: Negative for Connective tissue disease  Neurological:  Negative for Seizure   Behavioral/Psych:Negative for Bipolar disorder, Schizophrenia; Dementia  Endocrine: negative for thyroid, parathyroid disease    Physical Examination:    /66   Pulse 68   Ht 1.727 m (5' 7.99\")   Wt 122.9 kg (271 lb)   SpO2 94%   BMI 41.22 kg/m²    HEENT:  Face: Atraumatic, Conjunctiva: Pink; non icteric,  Mucous Memb:  Moist, No thyromegaly or Lymphadenopathy  Respiratory:  Resp Assessment: Normal, Resp Auscultation: Clear  Cardiovascular:  Auscultation: nl S1 & S2, Palpation:  Nl PMI; No heaves or thrills, JVP:  normal  Abdomen: Soft, non-tender, Normal bowel sounds,  No organomegaly  Extremities: No Cyanosis or Clubbing  Neurological: Oriented to time, place, and person, Non-anxious  Psychiatric: Normal mood and affect  Skin: Warm and dry,  No rash seen     Current Outpatient Medications   Medication Sig Dispense Refill    apixaban (ELIQUIS) 5 MG TABS tablet Take 1 tablet by mouth 2 times daily 60 tablet 5    empagliflozin (JARDIANCE) 10 MG tablet Take 1 tablet by mouth daily 90 tablet 5    lisinopril (PRINIVIL;ZESTRIL) 20 MG tablet Take 1 tablet by mouth daily 90 tablet 5    metoprolol succinate (TOPROL XL) 50 MG extended release tablet Take 1 tablet by mouth daily 90 tablet 5    torsemide (DEMADEX) 20 MG tablet Take 1 tablet by mouth daily 30 tablet 5    spironolactone (ALDACTONE) 25 MG tablet Take 1 tablet by mouth daily 90 tablet 5    gabapentin (NEURONTIN) 100 MG capsule Take 2 capsules by mouth 3 times daily.      ALPRAZolam (XANAX) 1 MG tablet Take 1 tablet by mouth 2 times daily as needed for Anxiety.

## 2024-06-27 ENCOUNTER — OFFICE VISIT (OUTPATIENT)
Dept: CARDIOLOGY CLINIC | Age: 69
End: 2024-06-27
Payer: MEDICARE

## 2024-06-27 VITALS
WEIGHT: 271 LBS | HEART RATE: 68 BPM | BODY MASS INDEX: 41.07 KG/M2 | HEIGHT: 68 IN | OXYGEN SATURATION: 94 % | SYSTOLIC BLOOD PRESSURE: 110 MMHG | DIASTOLIC BLOOD PRESSURE: 66 MMHG

## 2024-06-27 DIAGNOSIS — I42.0 DILATED CARDIOMYOPATHY (HCC): ICD-10-CM

## 2024-06-27 DIAGNOSIS — I48.91 ATRIAL FIBRILLATION, UNSPECIFIED TYPE (HCC): ICD-10-CM

## 2024-06-27 DIAGNOSIS — I48.91 ATRIAL FIBRILLATION, UNSPECIFIED TYPE (HCC): Primary | ICD-10-CM

## 2024-06-27 DIAGNOSIS — I42.9 CARDIOMYOPATHY, UNSPECIFIED TYPE (HCC): ICD-10-CM

## 2024-06-27 LAB
ALBUMIN SERPL-MCNC: 4.2 G/DL (ref 3.4–5)
ALBUMIN/GLOB SERPL: 1.3 {RATIO} (ref 1.1–2.2)
ALP SERPL-CCNC: 167 U/L (ref 40–129)
ALT SERPL-CCNC: 12 U/L (ref 10–40)
ANION GAP SERPL CALCULATED.3IONS-SCNC: 13 MMOL/L (ref 3–16)
AST SERPL-CCNC: 15 U/L (ref 15–37)
BILIRUB SERPL-MCNC: 0.7 MG/DL (ref 0–1)
BUN SERPL-MCNC: 21 MG/DL (ref 7–20)
CALCIUM SERPL-MCNC: 9.8 MG/DL (ref 8.3–10.6)
CHLORIDE SERPL-SCNC: 99 MMOL/L (ref 99–110)
CO2 SERPL-SCNC: 30 MMOL/L (ref 21–32)
CREAT SERPL-MCNC: 1.5 MG/DL (ref 0.8–1.3)
GFR SERPLBLD CREATININE-BSD FMLA CKD-EPI: 50 ML/MIN/{1.73_M2}
GLUCOSE SERPL-MCNC: 97 MG/DL (ref 70–99)
POTASSIUM SERPL-SCNC: 3.4 MMOL/L (ref 3.5–5.1)
PROT SERPL-MCNC: 7.4 G/DL (ref 6.4–8.2)
SODIUM SERPL-SCNC: 142 MMOL/L (ref 136–145)

## 2024-06-27 PROCEDURE — 99214 OFFICE O/P EST MOD 30 MIN: CPT | Performed by: INTERNAL MEDICINE

## 2024-06-27 PROCEDURE — 93000 ELECTROCARDIOGRAM COMPLETE: CPT | Performed by: INTERNAL MEDICINE

## 2024-06-27 PROCEDURE — 3074F SYST BP LT 130 MM HG: CPT | Performed by: INTERNAL MEDICINE

## 2024-06-27 PROCEDURE — 1123F ACP DISCUSS/DSCN MKR DOCD: CPT | Performed by: INTERNAL MEDICINE

## 2024-06-27 PROCEDURE — 3078F DIAST BP <80 MM HG: CPT | Performed by: INTERNAL MEDICINE

## 2024-06-27 RX ORDER — NICOTINE 21 MG/24HR
1 PATCH, TRANSDERMAL 24 HOURS TRANSDERMAL PRN
COMMUNITY
Start: 2024-05-16

## 2024-09-25 RX ORDER — APIXABAN 5 MG/1
5 TABLET, FILM COATED ORAL 2 TIMES DAILY
Qty: 60 TABLET | Refills: 3 | Status: SHIPPED | OUTPATIENT
Start: 2024-09-25

## 2024-10-25 RX ORDER — TORSEMIDE 20 MG/1
20 TABLET ORAL DAILY
Qty: 30 TABLET | Refills: 5 | Status: SHIPPED | OUTPATIENT
Start: 2024-10-25

## 2024-11-19 ENCOUNTER — HOSPITAL ENCOUNTER (OUTPATIENT)
Age: 69
Discharge: HOME | End: 2024-11-21
Attending: INTERNAL MEDICINE
Payer: MEDICARE

## 2024-11-19 VITALS
SYSTOLIC BLOOD PRESSURE: 128 MMHG | WEIGHT: 271 LBS | DIASTOLIC BLOOD PRESSURE: 71 MMHG | HEIGHT: 68 IN | BODY MASS INDEX: 41.07 KG/M2

## 2024-11-19 DIAGNOSIS — I42.9 CARDIOMYOPATHY, UNSPECIFIED TYPE (HCC): ICD-10-CM

## 2024-11-19 LAB
ECHO AO ROOT DIAM: 3.2 CM
ECHO AO ROOT INDEX: 1.37 CM/M2
ECHO AV AREA PEAK VELOCITY: 2.6 CM2
ECHO AV AREA VTI: 2.4 CM2
ECHO AV AREA/BSA PEAK VELOCITY: 1.1 CM2/M2
ECHO AV AREA/BSA VTI: 1 CM2/M2
ECHO AV CUSP MM: 2.1 CM
ECHO AV MEAN GRADIENT: 3 MMHG
ECHO AV MEAN VELOCITY: 0.9 M/S
ECHO AV PEAK GRADIENT: 6 MMHG
ECHO AV PEAK VELOCITY: 1.2 M/S
ECHO AV VELOCITY RATIO: 0.58
ECHO AV VTI: 22.9 CM
ECHO BSA: 2.43 M2
ECHO LA AREA 4C: 30.4 CM2
ECHO LA DIAMETER INDEX: 2.19 CM/M2
ECHO LA DIAMETER: 5.1 CM
ECHO LA MAJOR AXIS: 7.8 CM
ECHO LA TO AORTIC ROOT RATIO: 1.59
ECHO LA VOL MOD A4C: 102 ML (ref 18–58)
ECHO LA VOLUME INDEX MOD A4C: 44 ML/M2 (ref 16–34)
ECHO LV EDV A4C: 154 ML
ECHO LV EDV INDEX A4C: 66 ML/M2
ECHO LV EF PHYSICIAN: 38 %
ECHO LV EJECTION FRACTION A4C: 37 %
ECHO LV ESV A4C: 97 ML
ECHO LV ESV INDEX A4C: 42 ML/M2
ECHO LV FRACTIONAL SHORTENING: 18 % (ref 28–44)
ECHO LV INTERNAL DIMENSION DIASTOLE INDEX: 2.1 CM/M2
ECHO LV INTERNAL DIMENSION DIASTOLIC: 4.9 CM (ref 4.2–5.9)
ECHO LV INTERNAL DIMENSION SYSTOLIC INDEX: 1.72 CM/M2
ECHO LV INTERNAL DIMENSION SYSTOLIC: 4 CM
ECHO LV IVSD: 1.1 CM (ref 0.6–1)
ECHO LV MASS 2D: 200.5 G (ref 88–224)
ECHO LV MASS INDEX 2D: 86.1 G/M2 (ref 49–115)
ECHO LV POSTERIOR WALL DIASTOLIC: 1.1 CM (ref 0.6–1)
ECHO LV RELATIVE WALL THICKNESS RATIO: 0.45
ECHO LVOT AREA: 4.2 CM2
ECHO LVOT AV VTI INDEX: 0.56
ECHO LVOT DIAM: 2.3 CM
ECHO LVOT MEAN GRADIENT: 1 MMHG
ECHO LVOT PEAK GRADIENT: 2 MMHG
ECHO LVOT PEAK VELOCITY: 0.7 M/S
ECHO LVOT STROKE VOLUME INDEX: 22.8 ML/M2
ECHO LVOT SV: 53.2 ML
ECHO LVOT VTI: 12.8 CM
ECHO MV AREA VTI: 1.7 CM2
ECHO MV E DECELERATION TIME (DT): 219 MS
ECHO MV E VELOCITY: 1.23 M/S
ECHO MV LVOT VTI INDEX: 2.39
ECHO MV MAX VELOCITY: 1.3 M/S
ECHO MV MEAN GRADIENT: 3 MMHG
ECHO MV MEAN VELOCITY: 0.8 M/S
ECHO MV PEAK GRADIENT: 7 MMHG
ECHO MV REGURGITANT PEAK GRADIENT: 55 MMHG
ECHO MV REGURGITANT PEAK VELOCITY: 3.7 M/S
ECHO MV VTI: 30.6 CM
ECHO PV ACCELERATION TIME (AT): 77 MS
ECHO PV MAX VELOCITY: 0.7 M/S
ECHO PV PEAK GRADIENT: 2 MMHG
ECHO RA AREA 4C: 27.3 CM2
ECHO RA END SYSTOLIC VOLUME APICAL 4 CHAMBER INDEX BSA: 38 ML/M2
ECHO RA VOLUME: 88 ML
ECHO RV FREE WALL PEAK S': 12.7 CM/S
ECHO RV INTERNAL DIMENSION: 3 CM
ECHO RV TAPSE: 1.2 CM (ref 1.7–?)

## 2024-11-19 PROCEDURE — 93306 TTE W/DOPPLER COMPLETE: CPT

## 2024-11-19 NOTE — PROGRESS NOTES
[facility]  Cardiology Note      Kaleb Mcclellan  1955, 68 y.o.      11/20/24       CC: Heart failure, Afib with RVR, review ECHO          Enio Davalos MD:      HPI:     Mr. Kaleb Mckee is a 68-year-old gentleman with history of chronic A-fib, cardiomyopathy EF 15-20% CHF, A-fib with RVR    On his last visit I put him on guideline directed medical therapy that included Jardiance lisinopril Toprol torsemide and spironolactone.  I also ordered an echocardiogram on him which shows moderate pericardial effusion without tamponade and EF of about 35%.  He has had a stress nuclear scan which shows no perfusion defects in 2020    I was called yesterday by the echo technicians because of moderately large pericardial effusion without tamponade.  On today's visit the patient states that he is short of breath but does not have any orthopnea PND.  He drinks a lot of fluids including 5 cans of Pepsi a day..  He also used to be on torsemide 80 mg twice a day in the past.  He is taking his medicines regularly.        Past Medical History:   Diagnosis Date    A-fib (HCC)     irreg heartbeat    Arthritis     Bone spur     l ankle    DDD (degenerative disc disease)     DM (diabetes mellitus) (HCC)     ETOH abuse     recovering    Hepatitis C     HTN (hypertension)     Internal hemorrhoids     OA (osteoarthritis)     Sleep apnea     Smoker     ippd    Spinal stenosis       No past surgical history on file.     No family history on file.     Social History     Socioeconomic History    Marital status: Single     Spouse name: None    Number of children: None    Years of education: None    Highest education level: None   Tobacco Use    Smoking status: Every Day     Current packs/day: 1.00     Average packs/day: 1 pack/day for 45.0 years (45.0 ttl pk-yrs)     Types: Cigarettes   Substance and Sexual Activity    Alcohol use: Yes     Comment: 1 pint/day    Drug use: Yes     Types: Marijuana (Weed)     Social

## 2024-11-20 ENCOUNTER — OFFICE VISIT (OUTPATIENT)
Dept: CARDIOLOGY CLINIC | Age: 69
End: 2024-11-20
Payer: MEDICARE

## 2024-11-20 VITALS
HEART RATE: 86 BPM | OXYGEN SATURATION: 96 % | BODY MASS INDEX: 41.39 KG/M2 | SYSTOLIC BLOOD PRESSURE: 118 MMHG | DIASTOLIC BLOOD PRESSURE: 74 MMHG | WEIGHT: 272.2 LBS

## 2024-11-20 DIAGNOSIS — I48.91 ATRIAL FIBRILLATION, UNSPECIFIED TYPE (HCC): Primary | ICD-10-CM

## 2024-11-20 PROCEDURE — 93000 ELECTROCARDIOGRAM COMPLETE: CPT | Performed by: INTERNAL MEDICINE

## 2024-11-20 PROCEDURE — 1159F MED LIST DOCD IN RCRD: CPT | Performed by: INTERNAL MEDICINE

## 2024-11-20 PROCEDURE — 3078F DIAST BP <80 MM HG: CPT | Performed by: INTERNAL MEDICINE

## 2024-11-20 PROCEDURE — 99215 OFFICE O/P EST HI 40 MIN: CPT | Performed by: INTERNAL MEDICINE

## 2024-11-20 PROCEDURE — 3074F SYST BP LT 130 MM HG: CPT | Performed by: INTERNAL MEDICINE

## 2024-11-20 PROCEDURE — 1123F ACP DISCUSS/DSCN MKR DOCD: CPT | Performed by: INTERNAL MEDICINE

## 2024-11-20 RX ORDER — TORSEMIDE 20 MG/1
20 TABLET ORAL 2 TIMES DAILY
Qty: 180 TABLET | Refills: 5 | Status: SHIPPED | OUTPATIENT
Start: 2024-11-20

## 2025-01-23 RX ORDER — APIXABAN 5 MG/1
5 TABLET, FILM COATED ORAL 2 TIMES DAILY
Qty: 60 TABLET | Refills: 2 | Status: SHIPPED | OUTPATIENT
Start: 2025-01-23

## 2025-01-23 NOTE — TELEPHONE ENCOUNTER
Last OV: 11/20/24  Next OV: 02/24/24  Last refill: 09/25/24  Most recent Labs: 06/27/24  Last EKG (if needed):11/20/24

## 2025-02-23 NOTE — PROGRESS NOTES
[facility]  Cardiology Note      Kaleb Mcclellan  1955, 69 y.o.      3/20/25       CC: Systolic heart failure, atrial fibrillation, pericardial effusion      Enio Davalos MD:    Problem List:   Systolic heart failure  Pericardial effusion   Atrial fibrillation  Hypertension     HPI:     Mr. Kaleb Mckee is a 68-year-old gentleman with history of chronic A-fib, cardiomyopathy/systolic heart failure, and hypertension. Echocardiogram 11/19/24 showed moderate pericardial effusion without tamponade and EF of about 35%. He has had a stress nuclear scan which showed no perfusion defects in 2020.     Today, he presents in office for a 3 month follow up.           Past Medical History:   Diagnosis Date    A-fib (HCC)     irreg heartbeat    Arthritis     Bone spur     l ankle    DDD (degenerative disc disease)     DM (diabetes mellitus) (HCC)     ETOH abuse     recovering    Hepatitis C     HTN (hypertension)     Internal hemorrhoids     OA (osteoarthritis)     Sleep apnea     Smoker     ippd    Spinal stenosis       No past surgical history on file.     No family history on file.     Social History     Socioeconomic History    Marital status: Single     Spouse name: None    Number of children: None    Years of education: None    Highest education level: None   Tobacco Use    Smoking status: Every Day     Current packs/day: 1.00     Average packs/day: 1 pack/day for 45.0 years (45.0 ttl pk-yrs)     Types: Cigarettes   Substance and Sexual Activity    Alcohol use: Yes     Comment: 1 pint/day    Drug use: Yes     Types: Marijuana (Weed)     Social Drivers of Health     Food Insecurity: No Food Insecurity (2/9/2024)    Hunger Vital Sign     Worried About Running Out of Food in the Last Year: Never true     Ran Out of Food in the Last Year: Never true   Transportation Needs: No Transportation Needs (2/9/2024)    PRAPARE - Transportation     Lack of Transportation (Medical): No     Lack of Transportation

## 2025-03-20 ENCOUNTER — OFFICE VISIT (OUTPATIENT)
Dept: CARDIOLOGY CLINIC | Age: 70
End: 2025-03-20

## 2025-03-20 VITALS
HEART RATE: 89 BPM | WEIGHT: 260.2 LBS | DIASTOLIC BLOOD PRESSURE: 60 MMHG | BODY MASS INDEX: 39.56 KG/M2 | SYSTOLIC BLOOD PRESSURE: 118 MMHG | OXYGEN SATURATION: 98 %

## 2025-03-20 DIAGNOSIS — I10 HYPERTENSION, UNSPECIFIED TYPE: ICD-10-CM

## 2025-03-20 DIAGNOSIS — I42.8 NONISCHEMIC CARDIOMYOPATHY (HCC): ICD-10-CM

## 2025-03-20 DIAGNOSIS — I10 HYPERTENSION, UNSPECIFIED TYPE: Primary | ICD-10-CM

## 2025-03-20 DIAGNOSIS — Z79.01 CHRONIC ANTICOAGULATION: ICD-10-CM

## 2025-03-20 LAB
ALBUMIN SERPL-MCNC: 4.3 G/DL (ref 3.4–5)
ALBUMIN/GLOB SERPL: 1.6 {RATIO} (ref 1.1–2.2)
ALP SERPL-CCNC: 127 U/L (ref 40–129)
ALT SERPL-CCNC: 9 U/L (ref 10–40)
ANION GAP SERPL CALCULATED.3IONS-SCNC: 13 MMOL/L (ref 3–16)
AST SERPL-CCNC: 15 U/L (ref 15–37)
BILIRUB SERPL-MCNC: 0.4 MG/DL (ref 0–1)
BUN SERPL-MCNC: 43 MG/DL (ref 7–20)
CALCIUM SERPL-MCNC: 10.3 MG/DL (ref 8.3–10.6)
CHLORIDE SERPL-SCNC: 100 MMOL/L (ref 99–110)
CO2 SERPL-SCNC: 26 MMOL/L (ref 21–32)
CREAT SERPL-MCNC: 2.2 MG/DL (ref 0.8–1.3)
DEPRECATED RDW RBC AUTO: 15.8 % (ref 12.4–15.4)
GFR SERPLBLD CREATININE-BSD FMLA CKD-EPI: 31 ML/MIN/{1.73_M2}
GLUCOSE SERPL-MCNC: 96 MG/DL (ref 70–99)
HCT VFR BLD AUTO: 46.3 % (ref 40.5–52.5)
HGB BLD-MCNC: 15.3 G/DL (ref 13.5–17.5)
MCH RBC QN AUTO: 29.4 PG (ref 26–34)
MCHC RBC AUTO-ENTMCNC: 33.1 G/DL (ref 31–36)
MCV RBC AUTO: 89 FL (ref 80–100)
PLATELET # BLD AUTO: 172 K/UL (ref 135–450)
PMV BLD AUTO: 12.6 FL (ref 5–10.5)
POTASSIUM SERPL-SCNC: 5.5 MMOL/L (ref 3.5–5.1)
PROT SERPL-MCNC: 7 G/DL (ref 6.4–8.2)
RBC # BLD AUTO: 5.21 M/UL (ref 4.2–5.9)
SODIUM SERPL-SCNC: 139 MMOL/L (ref 136–145)
WBC # BLD AUTO: 9.9 K/UL (ref 4–11)

## 2025-03-20 RX ORDER — SPIRONOLACTONE 25 MG/1
25 TABLET ORAL DAILY
Qty: 90 TABLET | Refills: 5 | Status: SHIPPED | OUTPATIENT
Start: 2025-03-20

## 2025-03-20 RX ORDER — TORSEMIDE 20 MG/1
20 TABLET ORAL 2 TIMES DAILY
Qty: 180 TABLET | Refills: 5 | Status: SHIPPED | OUTPATIENT
Start: 2025-03-20

## 2025-03-20 RX ORDER — LISINOPRIL 20 MG/1
20 TABLET ORAL DAILY
Qty: 90 TABLET | Refills: 5 | Status: SHIPPED | OUTPATIENT
Start: 2025-03-20

## 2025-03-20 RX ORDER — METOPROLOL SUCCINATE 50 MG/1
50 TABLET, EXTENDED RELEASE ORAL DAILY
Qty: 90 TABLET | Refills: 5 | Status: SHIPPED | OUTPATIENT
Start: 2025-03-20

## 2025-03-21 ENCOUNTER — RESULTS FOLLOW-UP (OUTPATIENT)
Dept: CARDIOLOGY CLINIC | Age: 70
End: 2025-03-21

## 2025-03-21 DIAGNOSIS — E87.5 HYPERKALEMIA: Primary | ICD-10-CM

## 2025-03-26 ENCOUNTER — TELEPHONE (OUTPATIENT)
Dept: CARDIOLOGY CLINIC | Age: 70
End: 2025-03-26

## 2025-03-26 NOTE — TELEPHONE ENCOUNTER
Typed letter in Epic.  Requesting patient to call our office for his test results.  Placed with outgoing mail at [facility] office.

## 2025-04-01 ENCOUNTER — RESULTS FOLLOW-UP (OUTPATIENT)
Dept: CARDIOLOGY CLINIC | Age: 70
End: 2025-04-01

## 2025-04-01 DIAGNOSIS — E87.5 HYPERKALEMIA: ICD-10-CM

## 2025-04-01 LAB
ANION GAP SERPL CALCULATED.3IONS-SCNC: 10 MMOL/L (ref 3–16)
BUN SERPL-MCNC: 31 MG/DL (ref 7–20)
CALCIUM SERPL-MCNC: 9.9 MG/DL (ref 8.3–10.6)
CHLORIDE SERPL-SCNC: 104 MMOL/L (ref 99–110)
CO2 SERPL-SCNC: 27 MMOL/L (ref 21–32)
CREAT SERPL-MCNC: 1.6 MG/DL (ref 0.8–1.3)
GFR SERPLBLD CREATININE-BSD FMLA CKD-EPI: 46 ML/MIN/{1.73_M2}
GLUCOSE SERPL-MCNC: 131 MG/DL (ref 70–99)
POTASSIUM SERPL-SCNC: 5 MMOL/L (ref 3.5–5.1)
SODIUM SERPL-SCNC: 141 MMOL/L (ref 136–145)

## 2025-04-04 NOTE — TELEPHONE ENCOUNTER
Spoke to patient he verbally understood message.    He wants to know when he will start back on the Jardiance and spironolactone     Please advise

## 2025-04-15 NOTE — TELEPHONE ENCOUNTER
Kaleb is wanting to know when to resume his Jardiance medication? Recently had labs completed 4/1, is Kaleb needing to repeat?     Please advise.

## 2025-04-16 DIAGNOSIS — E87.5 HYPERKALEMIA: Primary | ICD-10-CM

## 2025-04-17 NOTE — TELEPHONE ENCOUNTER
Kaelb returning call - relayed to hold both spironolactone and jardiance and repeat labs in 1 month. Kaleb v/u. Informed labs are in currently and can show up to any [facility] lab in 1 month for draw.

## 2025-05-23 DIAGNOSIS — E87.5 HYPERKALEMIA: ICD-10-CM

## 2025-05-23 LAB
ANION GAP SERPL CALCULATED.3IONS-SCNC: 11 MMOL/L (ref 3–16)
BUN SERPL-MCNC: 23 MG/DL (ref 7–20)
CALCIUM SERPL-MCNC: 9.7 MG/DL (ref 8.3–10.6)
CHLORIDE SERPL-SCNC: 98 MMOL/L (ref 99–110)
CO2 SERPL-SCNC: 30 MMOL/L (ref 21–32)
CREAT SERPL-MCNC: 1.4 MG/DL (ref 0.8–1.3)
GFR SERPLBLD CREATININE-BSD FMLA CKD-EPI: 54 ML/MIN/{1.73_M2}
GLUCOSE SERPL-MCNC: 126 MG/DL (ref 70–99)
POTASSIUM SERPL-SCNC: 4.3 MMOL/L (ref 3.5–5.1)
SODIUM SERPL-SCNC: 139 MMOL/L (ref 136–145)

## 2025-05-27 ENCOUNTER — RESULTS FOLLOW-UP (OUTPATIENT)
Dept: CARDIOLOGY CLINIC | Age: 70
End: 2025-05-27

## 2025-05-28 NOTE — TELEPHONE ENCOUNTER
Kaleb called the office relaying he completed labs on Friday. He is wanting to know the results and any advisements?    Kaleb's callback: [phone]

## 2025-05-29 ENCOUNTER — TELEPHONE (OUTPATIENT)
Dept: CARDIOLOGY CLINIC | Age: 70
End: 2025-05-29

## 2025-05-29 NOTE — TELEPHONE ENCOUNTER
Patient called in.   He states he received a call from Dr. Pendleton to go over his lab results.   He also believes he needs to make an appt.     Please advise.     Callback: [phone]

## 2025-07-02 ENCOUNTER — OFFICE VISIT (OUTPATIENT)
Dept: CARDIOLOGY CLINIC | Age: 70
End: 2025-07-02
Payer: MEDICARE

## 2025-07-02 VITALS
HEIGHT: 68 IN | WEIGHT: 252 LBS | HEART RATE: 92 BPM | DIASTOLIC BLOOD PRESSURE: 60 MMHG | SYSTOLIC BLOOD PRESSURE: 96 MMHG | OXYGEN SATURATION: 95 % | BODY MASS INDEX: 38.19 KG/M2

## 2025-07-02 DIAGNOSIS — I48.20 CHRONIC ATRIAL FIBRILLATION (HCC): ICD-10-CM

## 2025-07-02 DIAGNOSIS — I10 ESSENTIAL HYPERTENSION: ICD-10-CM

## 2025-07-02 DIAGNOSIS — I50.22 CHRONIC SYSTOLIC CONGESTIVE HEART FAILURE (HCC): Primary | ICD-10-CM

## 2025-07-02 DIAGNOSIS — I31.39 PERICARDIAL EFFUSION: ICD-10-CM

## 2025-07-02 DIAGNOSIS — I50.22 CHRONIC SYSTOLIC CONGESTIVE HEART FAILURE (HCC): ICD-10-CM

## 2025-07-02 PROCEDURE — 3078F DIAST BP <80 MM HG: CPT | Performed by: INTERNAL MEDICINE

## 2025-07-02 PROCEDURE — 93000 ELECTROCARDIOGRAM COMPLETE: CPT | Performed by: INTERNAL MEDICINE

## 2025-07-02 PROCEDURE — 1123F ACP DISCUSS/DSCN MKR DOCD: CPT | Performed by: INTERNAL MEDICINE

## 2025-07-02 PROCEDURE — 3074F SYST BP LT 130 MM HG: CPT | Performed by: INTERNAL MEDICINE

## 2025-07-02 PROCEDURE — 99214 OFFICE O/P EST MOD 30 MIN: CPT | Performed by: INTERNAL MEDICINE

## 2025-07-02 PROCEDURE — 1159F MED LIST DOCD IN RCRD: CPT | Performed by: INTERNAL MEDICINE

## 2025-07-02 RX ORDER — LISINOPRIL 20 MG/1
10 TABLET ORAL DAILY
Qty: 90 TABLET | Refills: 5
Start: 2025-07-02

## 2025-07-02 NOTE — PROGRESS NOTES
[facility]  Cardiology Note      Kaleb Mcclellan  1955, 69 y.o.      7/1/25       CC: ***    Enio Davalos MD:    Problem List:   Systolic heart failure  Pericardial effusion   Atrial fibrillation  Hypertension     HPI:   Mr. Kaleb Mkcee is a 69-year-old gentleman with history of chronic A-fib, cardiomyopathy/systolic heart failure, and hypertension. Echocardiogram 11/19/24 showed moderate pericardial effusion without tamponade and EF of about 35%. He has had a stress nuclear scan which showed no perfusion defects in 2020.     Today, he presents in office for a 3 month follow-up.           Past Medical History:   Diagnosis Date    A-fib (HCC)     irreg heartbeat    Arthritis     Bone spur     l ankle    DDD (degenerative disc disease)     DM (diabetes mellitus) (HCC)     ETOH abuse     recovering    Hepatitis C     HTN (hypertension)     Internal hemorrhoids     OA (osteoarthritis)     Sleep apnea     Smoker     ippd    Spinal stenosis       No past surgical history on file.     No family history on file.     Social History     Socioeconomic History    Marital status: Single   Tobacco Use    Smoking status: Every Day     Current packs/day: 1.00     Average packs/day: 1 pack/day for 45.0 years (45.0 ttl pk-yrs)     Types: Cigarettes   Substance and Sexual Activity    Alcohol use: Yes     Comment: 1 pint/day    Drug use: Yes     Types: Marijuana (Weed)     Social Drivers of Health     Food Insecurity: No Food Insecurity (2/9/2024)    Hunger Vital Sign     Worried About Running Out of Food in the Last Year: Never true     Ran Out of Food in the Last Year: Never true   Transportation Needs: No Transportation Needs (2/9/2024)    PRAPARE - Transportation     Lack of Transportation (Medical): No     Lack of Transportation (Non-Medical): No   Housing Stability: Low Risk  (2/9/2024)    Housing Stability Vital Sign     Unable to Pay for Housing in the Last Year: No     Number of Places Lived in the 
hesitate to contact me if you have any questions.      Sincerely,        LEANDRA Pendleton M.D  [facility]    This note was scribed in the presence of Dr. LEANDRA Pendleton MD by Keri Cintron RN.

## 2025-07-03 ENCOUNTER — RESULTS FOLLOW-UP (OUTPATIENT)
Dept: CARDIOLOGY CLINIC | Age: 70
End: 2025-07-03

## 2025-07-03 LAB
ANION GAP SERPL CALCULATED.3IONS-SCNC: 9 MMOL/L (ref 3–16)
BUN SERPL-MCNC: 27 MG/DL (ref 7–20)
CALCIUM SERPL-MCNC: 9.7 MG/DL (ref 8.3–10.6)
CHLORIDE SERPL-SCNC: 101 MMOL/L (ref 99–110)
CO2 SERPL-SCNC: 31 MMOL/L (ref 21–32)
CREAT SERPL-MCNC: 1.6 MG/DL (ref 0.8–1.3)
GFR SERPLBLD CREATININE-BSD FMLA CKD-EPI: 46 ML/MIN/{1.73_M2}
GLUCOSE SERPL-MCNC: 117 MG/DL (ref 70–99)
POTASSIUM SERPL-SCNC: 4.9 MMOL/L (ref 3.5–5.1)
SODIUM SERPL-SCNC: 141 MMOL/L (ref 136–145)

## 2025-07-14 ENCOUNTER — TELEPHONE (OUTPATIENT)
Dept: PHARMACY | Age: 70
End: 2025-07-14

## 2025-07-14 NOTE — TELEPHONE ENCOUNTER
Kaleb missed a visit 7/2024 for heart failure services and appears that he was lost to follow up.     I reached out today to check status and to see if he would like to schedule a follow up. Asked that he return our call to give us a status update.     He continues to follow with Dr. Pendleton with [facility].     Will check back in 1-2 weeks if we do not hear from him.     Sarah Land, PharmD  [facility]  [facility]patient [facility]  Heart Failure Service  [phone]    For Pharmacy Admin Tracking Only    Program: Medication Management  CPA in place:  Yes  Recommendation Provided To:   Intervention Detail:   Intervention Accepted By:   Gap Closed?:    Time Spent (min): 5

## 2025-07-31 ENCOUNTER — PHARMACY VISIT (OUTPATIENT)
Dept: PHARMACY | Age: 70
End: 2025-07-31
Payer: MEDICARE

## 2025-07-31 VITALS — BODY MASS INDEX: 40.6 KG/M2 | WEIGHT: 267 LBS

## 2025-07-31 DIAGNOSIS — I50.22 CHRONIC SYSTOLIC CONGESTIVE HEART FAILURE (HCC): Primary | ICD-10-CM

## 2025-07-31 PROCEDURE — 99213 OFFICE O/P EST LOW 20 MIN: CPT

## 2025-07-31 RX ORDER — LISINOPRIL 10 MG/1
10 TABLET ORAL DAILY
Qty: 90 TABLET | Refills: 3 | Status: SHIPPED | OUTPATIENT
Start: 2025-07-31

## 2025-07-31 NOTE — PROGRESS NOTES
[facility]  Outpatient Wellness Center  Pharmacist  Heart Failure    [address], [place] [address]  Phone: [phone]  Fax: [phone]      NAME: Kaleb Mcclellan  MEDICAL RECORD NUMBER:  [id number]  AGE: 69 y.o.   GENDER: male  : 1955  EPISODE DATE:  2025      Kaleb Mcclellan was referred to the Wellness Center by Matthew Pendleton MD  for heart failure services.   Special Instructions per the Referral Include: Special instructions per referral include: patient education    ECHO EF%: 35-40 Date: 2024   ECHO EF%: 15-20 Date: 2024    ECHO EF%: 25-30 Date: 2020        ICD-10-CM    1. Chronic systolic congestive heart failure (HCC)  I50.22         HFrEF (</= 40%)    Date of Heart Failure Diagnosis:  Greater than one year    Factors or barriers affecting overall care:   none     Subjective   Kaleb Mcclellan is a 69 y.o. male here for the Heart Failure Services.    They are here today for a comprehensive medication review including over the counter medications and herbal products, overall wellbeing assessment, transition of care, extensive education and any needed adjustments with updates and recommendations communicated to the referring physician.      New [place] Heart Association Classification based on patient symptoms:   Class III: Marked limitation of physical activity.  Comfortable at rest.  Less than ordinary activity causes fatigue, palpitation, dyspnea.    ACC/AHA Stage:   Stage C (with current or previous symptoms/signs of HF)    Recent hospitalization:   Hospitalized at [facility] from 2/3 to 2/10/24  Chief Complaint: fatigue, weakness, stopped medications  Hospital Brief Assessment: Hypoxic; Heart failure; AFib; HTN      Kaleb Mcclellan appears well, in no acute distress. Comes with with a cane. Is here today alone for further heart failure education and assessment. Kaleb appears ready, willing and able to engage in self-management

## 2025-07-31 NOTE — PATIENT INSTRUCTIONS
- Consider taking an extra dose of torsemide today and tomorrow if needed for weight increase and swelling as instructed by Dr. Pendleton. If your weight does not come down and your swelling continues after two days of the extra torsemide then call Dr. Pendleton [phone] or discuss with Dr. Davalos when you see him next week.     - I would take torsemide at 9am as you do and again at 6pm so as to not keep you up at night.    - I would have Dr. Davalos check your A1C to see how you are doing with regards to your diabetes    - Reach out to  about living arrangements. Sarah Howell [phone]. [phone].  - See if Dr. Davalos will refer you to a lung doctor (Pulmonologist)    - Continue to work to improve your sleep habits. Follow the health sleep habit handout.   - Consider a sleep study. The lung doctor will likely order this if they feel you need it.     - Continue to work to decrease smoking and work to quit smoking.   - Consider calling [phone] NOW.    - Weigh yourself daily with the new scale provided.  Call the heart doctor at [phone] if you gain 3 pounds in a day or 5 pounds in week.     - continue all medications as prescribed.  - avoid NSAIDs like ibuprofen, Advil, Aleve, naproxen. Tylenol or generic acetaminophen is okay unless your doctor told you otherwise.  - continue daily weights and call [facility] [phone] or  [phone] if you have weight gain of 3 pounds in a day or 5 pounds in a week.   - limit sodium to no more than 2,000mg per day.   - limit fluid to no more than 2,000mL (64 ounces) per day   - call [facility] [phone] right away with any signs or symptoms of heart failure like increased shortness of breath or swelling  - get regular activity with a goal of 30 minutes 5 times a week, follow a heart healthy eating pattern like the Mediterranean way of eating, remain up to date with vaccinations, avoid or limit alcohol and do not smoke.